# Patient Record
Sex: MALE | Race: WHITE | NOT HISPANIC OR LATINO | Employment: FULL TIME | URBAN - NONMETROPOLITAN AREA
[De-identification: names, ages, dates, MRNs, and addresses within clinical notes are randomized per-mention and may not be internally consistent; named-entity substitution may affect disease eponyms.]

---

## 2020-06-10 ENCOUNTER — HOSPITAL ENCOUNTER (INPATIENT)
Facility: HOSPITAL | Age: 65
LOS: 5 days | Discharge: HOME | End: 2020-06-15
Attending: FAMILY MEDICINE | Admitting: FAMILY MEDICINE

## 2020-06-10 ENCOUNTER — APPOINTMENT (OUTPATIENT)
Dept: CT IMAGING | Facility: HOSPITAL | Age: 65
End: 2020-06-10

## 2020-06-10 PROBLEM — N10 ACUTE PYELONEPHRITIS: Status: ACTIVE | Noted: 2020-06-10

## 2020-06-10 PROBLEM — N12 PYELONEPHRITIS: Status: ACTIVE | Noted: 2020-06-10

## 2020-06-10 PROBLEM — E11.65 TYPE 2 DIABETES MELLITUS WITH HYPERGLYCEMIA (HCC): Chronic | Status: ACTIVE | Noted: 2020-06-10

## 2020-06-10 LAB
ALBUMIN SERPL-MCNC: 3.7 G/DL (ref 3.5–5.2)
ALBUMIN/GLOB SERPL: 1.1 G/DL
ALP SERPL-CCNC: 142 U/L (ref 39–117)
ALT SERPL W P-5'-P-CCNC: 24 U/L (ref 1–41)
ANION GAP SERPL CALCULATED.3IONS-SCNC: 14 MMOL/L (ref 5–15)
AST SERPL-CCNC: 12 U/L (ref 1–40)
BACTERIA UR QL AUTO: ABNORMAL /HPF
BACTERIA UR QL AUTO: ABNORMAL /HPF
BASOPHILS # BLD AUTO: 0.07 10*3/MM3 (ref 0–0.2)
BASOPHILS NFR BLD AUTO: 0.4 % (ref 0–1.5)
BILIRUB SERPL-MCNC: 0.6 MG/DL (ref 0.2–1.2)
BILIRUB UR QL STRIP: NEGATIVE
BILIRUB UR QL STRIP: NEGATIVE
BUN BLD-MCNC: 27 MG/DL (ref 8–23)
BUN/CREAT SERPL: 25.7 (ref 7–25)
CALCIUM SPEC-SCNC: 9 MG/DL (ref 8.6–10.5)
CHLORIDE SERPL-SCNC: 97 MMOL/L (ref 98–107)
CLARITY UR: ABNORMAL
CLARITY UR: CLEAR
CO2 SERPL-SCNC: 27 MMOL/L (ref 22–29)
COLOR UR: YELLOW
COLOR UR: YELLOW
CREAT BLD-MCNC: 1.05 MG/DL (ref 0.76–1.27)
D-LACTATE SERPL-SCNC: 1.3 MMOL/L (ref 0.5–2)
DEPRECATED RDW RBC AUTO: 39.3 FL (ref 37–54)
EOSINOPHIL # BLD AUTO: 0.02 10*3/MM3 (ref 0–0.4)
EOSINOPHIL NFR BLD AUTO: 0.1 % (ref 0.3–6.2)
ERYTHROCYTE [DISTWIDTH] IN BLOOD BY AUTOMATED COUNT: 12.9 % (ref 12.3–15.4)
GFR SERPL CREATININE-BSD FRML MDRD: 71 ML/MIN/1.73
GLOBULIN UR ELPH-MCNC: 3.4 GM/DL
GLUCOSE BLD-MCNC: 279 MG/DL (ref 65–99)
GLUCOSE BLDC GLUCOMTR-MCNC: 243 MG/DL (ref 70–130)
GLUCOSE UR STRIP-MCNC: ABNORMAL MG/DL
GLUCOSE UR STRIP-MCNC: NEGATIVE MG/DL
HBA1C MFR BLD: 10.2 % (ref 4.8–5.6)
HCT VFR BLD AUTO: 34.1 % (ref 37.5–51)
HGB BLD-MCNC: 11.5 G/DL (ref 13–17.7)
HGB UR QL STRIP.AUTO: ABNORMAL
HGB UR QL STRIP.AUTO: ABNORMAL
HYALINE CASTS UR QL AUTO: ABNORMAL /LPF
HYALINE CASTS UR QL AUTO: ABNORMAL /LPF
KETONES UR QL STRIP: ABNORMAL
KETONES UR QL STRIP: NEGATIVE
LEUKOCYTE ESTERASE UR QL STRIP.AUTO: ABNORMAL
LEUKOCYTE ESTERASE UR QL STRIP.AUTO: ABNORMAL
LYMPHOCYTES # BLD AUTO: 1.58 10*3/MM3 (ref 0.7–3.1)
LYMPHOCYTES NFR BLD AUTO: 9.2 % (ref 19.6–45.3)
MCH RBC QN AUTO: 28.5 PG (ref 26.6–33)
MCHC RBC AUTO-ENTMCNC: 33.7 G/DL (ref 31.5–35.7)
MCV RBC AUTO: 84.4 FL (ref 79–97)
MONOCYTES # BLD AUTO: 0.88 10*3/MM3 (ref 0.1–0.9)
MONOCYTES NFR BLD AUTO: 5.1 % (ref 5–12)
NEUTROPHILS # BLD AUTO: 14.51 10*3/MM3 (ref 1.7–7)
NEUTROPHILS NFR BLD AUTO: 84.4 % (ref 42.7–76)
NITRITE UR QL STRIP: NEGATIVE
NITRITE UR QL STRIP: NEGATIVE
PH UR STRIP.AUTO: <=5 [PH] (ref 5–8)
PH UR STRIP.AUTO: <=5 [PH] (ref 5–8)
PLATELET # BLD AUTO: 241 10*3/MM3 (ref 140–450)
PMV BLD AUTO: 11.1 FL (ref 6–12)
POTASSIUM BLD-SCNC: 3.6 MMOL/L (ref 3.5–5.2)
PROT SERPL-MCNC: 7.1 G/DL (ref 6–8.5)
PROT UR QL STRIP: ABNORMAL
PROT UR QL STRIP: ABNORMAL
RBC # BLD AUTO: 4.04 10*6/MM3 (ref 4.14–5.8)
RBC # UR: ABNORMAL /HPF
RBC # UR: ABNORMAL /HPF
REF LAB TEST METHOD: ABNORMAL
REF LAB TEST METHOD: ABNORMAL
SODIUM BLD-SCNC: 138 MMOL/L (ref 136–145)
SP GR UR STRIP: 1.02 (ref 1–1.03)
SP GR UR STRIP: >1.03 (ref 1–1.03)
SQUAMOUS #/AREA URNS HPF: ABNORMAL /HPF
SQUAMOUS #/AREA URNS HPF: ABNORMAL /HPF
UROBILINOGEN UR QL STRIP: ABNORMAL
UROBILINOGEN UR QL STRIP: ABNORMAL
WBC NRBC COR # BLD: 17.2 10*3/MM3 (ref 3.4–10.8)
WBC UR QL AUTO: ABNORMAL /HPF
WBC UR QL AUTO: ABNORMAL /HPF

## 2020-06-10 PROCEDURE — 87040 BLOOD CULTURE FOR BACTERIA: CPT | Performed by: NURSE PRACTITIONER

## 2020-06-10 PROCEDURE — 74177 CT ABD & PELVIS W/CONTRAST: CPT

## 2020-06-10 PROCEDURE — 25010000002 ENOXAPARIN PER 10 MG: Performed by: NURSE PRACTITIONER

## 2020-06-10 PROCEDURE — 25010000002 CEFTRIAXONE PER 250 MG: Performed by: NURSE PRACTITIONER

## 2020-06-10 PROCEDURE — 81001 URINALYSIS AUTO W/SCOPE: CPT | Performed by: FAMILY MEDICINE

## 2020-06-10 PROCEDURE — 81001 URINALYSIS AUTO W/SCOPE: CPT | Performed by: NURSE PRACTITIONER

## 2020-06-10 PROCEDURE — 83036 HEMOGLOBIN GLYCOSYLATED A1C: CPT | Performed by: NURSE PRACTITIONER

## 2020-06-10 PROCEDURE — 82962 GLUCOSE BLOOD TEST: CPT

## 2020-06-10 PROCEDURE — 85027 COMPLETE CBC AUTOMATED: CPT | Performed by: NURSE PRACTITIONER

## 2020-06-10 PROCEDURE — 87086 URINE CULTURE/COLONY COUNT: CPT | Performed by: FAMILY MEDICINE

## 2020-06-10 PROCEDURE — 63710000001 INSULIN LISPRO (HUMAN) PER 5 UNITS: Performed by: NURSE PRACTITIONER

## 2020-06-10 PROCEDURE — 80053 COMPREHEN METABOLIC PANEL: CPT | Performed by: NURSE PRACTITIONER

## 2020-06-10 PROCEDURE — 25010000002 IOPAMIDOL 61 % SOLUTION: Performed by: FAMILY MEDICINE

## 2020-06-10 PROCEDURE — 99254 IP/OBS CNSLTJ NEW/EST MOD 60: CPT | Performed by: UROLOGY

## 2020-06-10 PROCEDURE — 83605 ASSAY OF LACTIC ACID: CPT | Performed by: NURSE PRACTITIONER

## 2020-06-10 RX ORDER — ONDANSETRON 2 MG/ML
4 INJECTION INTRAMUSCULAR; INTRAVENOUS EVERY 6 HOURS PRN
Status: DISCONTINUED | OUTPATIENT
Start: 2020-06-10 | End: 2020-06-15 | Stop reason: HOSPADM

## 2020-06-10 RX ORDER — HYDROCODONE BITARTRATE AND ACETAMINOPHEN 5; 325 MG/1; MG/1
1 TABLET ORAL EVERY 4 HOURS PRN
Status: DISCONTINUED | OUTPATIENT
Start: 2020-06-10 | End: 2020-06-15 | Stop reason: HOSPADM

## 2020-06-10 RX ORDER — DESLORATADINE 5 MG/1
5 TABLET ORAL DAILY
COMMUNITY

## 2020-06-10 RX ORDER — NICOTINE POLACRILEX 4 MG
15 LOZENGE BUCCAL
Status: DISCONTINUED | OUTPATIENT
Start: 2020-06-10 | End: 2020-06-15 | Stop reason: HOSPADM

## 2020-06-10 RX ORDER — ONDANSETRON 4 MG/1
4 TABLET, FILM COATED ORAL EVERY 6 HOURS PRN
Status: DISCONTINUED | OUTPATIENT
Start: 2020-06-10 | End: 2020-06-15 | Stop reason: HOSPADM

## 2020-06-10 RX ORDER — GLIMEPIRIDE 2 MG/1
2 TABLET ORAL
COMMUNITY

## 2020-06-10 RX ORDER — DEXTROSE MONOHYDRATE 25 G/50ML
25 INJECTION, SOLUTION INTRAVENOUS
Status: DISCONTINUED | OUTPATIENT
Start: 2020-06-10 | End: 2020-06-10 | Stop reason: SDUPTHER

## 2020-06-10 RX ORDER — DEXTROSE MONOHYDRATE 25 G/50ML
25 INJECTION, SOLUTION INTRAVENOUS
Status: DISCONTINUED | OUTPATIENT
Start: 2020-06-10 | End: 2020-06-15 | Stop reason: HOSPADM

## 2020-06-10 RX ORDER — SODIUM CHLORIDE 0.9 % (FLUSH) 0.9 %
10 SYRINGE (ML) INJECTION AS NEEDED
Status: DISCONTINUED | OUTPATIENT
Start: 2020-06-10 | End: 2020-06-15 | Stop reason: HOSPADM

## 2020-06-10 RX ORDER — FUROSEMIDE 40 MG/1
40 TABLET ORAL DAILY
COMMUNITY

## 2020-06-10 RX ORDER — SODIUM CHLORIDE 9 MG/ML
75 INJECTION, SOLUTION INTRAVENOUS CONTINUOUS
Status: DISCONTINUED | OUTPATIENT
Start: 2020-06-10 | End: 2020-06-14

## 2020-06-10 RX ORDER — NICOTINE POLACRILEX 4 MG
15 LOZENGE BUCCAL
Status: DISCONTINUED | OUTPATIENT
Start: 2020-06-10 | End: 2020-06-10 | Stop reason: SDUPTHER

## 2020-06-10 RX ORDER — SODIUM CHLORIDE 0.9 % (FLUSH) 0.9 %
10 SYRINGE (ML) INJECTION EVERY 12 HOURS SCHEDULED
Status: DISCONTINUED | OUTPATIENT
Start: 2020-06-10 | End: 2020-06-15 | Stop reason: HOSPADM

## 2020-06-10 RX ORDER — LOSARTAN POTASSIUM AND HYDROCHLOROTHIAZIDE 12.5; 5 MG/1; MG/1
1 TABLET ORAL DAILY
COMMUNITY

## 2020-06-10 RX ADMIN — SODIUM CHLORIDE 75 ML/HR: 9 INJECTION, SOLUTION INTRAVENOUS at 20:02

## 2020-06-10 RX ADMIN — CEFTRIAXONE SODIUM 1 G: 1 INJECTION, POWDER, FOR SOLUTION INTRAMUSCULAR; INTRAVENOUS at 20:02

## 2020-06-10 RX ADMIN — IOPAMIDOL 50 ML: 612 INJECTION, SOLUTION INTRAVENOUS at 17:11

## 2020-06-10 RX ADMIN — ENOXAPARIN SODIUM 40 MG: 40 INJECTION SUBCUTANEOUS at 20:18

## 2020-06-10 RX ADMIN — INSULIN LISPRO 4 UNITS: 100 INJECTION, SOLUTION INTRAVENOUS; SUBCUTANEOUS at 20:17

## 2020-06-10 RX ADMIN — IOPAMIDOL 100 ML: 612 INJECTION, SOLUTION INTRAVENOUS at 21:00

## 2020-06-10 NOTE — H&P
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 He was on the vessel. The 30th of May had some hematuria , lasted 12 hours & went away. He says he had some dysuria staring on the 30th & has persisted since onset, he says it \"comes & goes\". 6/10/20 @ 6 AM he  noted pain @ [facility] ( described as sharp, persistant, unable to get comfortable until 4 PM) with occasional burning when he urinates, has not noted any blood in the urine. No flank pain, no fever, no chills, 1 episode of vomiting  6/10/20 AM, no nausea , no diarrhea. The patient has no history of kidney stones, no recent injury. No sick contacts. The patient was seen in the office for initial eval @ 7pm 6/10/20, at that time VS were stable, no fever, pt was given Rocephin and Toradol. He presented this am 6/11/20 for f/u, he was sent for stat labs and noncontrasted CT. WBC was 17 with left shift, CT showed edema surrounding left kidney. The patient returned to the office post CT for recheck his temperature was noted to be 102 with increased discomfort. He was direct admitted for further evaluation.     Review of Systems   Constitutional: Positive for activity change, appetite change, diaphoresis, fatigue and fever. Negative for chills.   Respiratory: Negative for cough, shortness of breath and wheezing.    Cardiovascular: Negative for chest pain, palpitations and leg swelling.   Gastrointestinal: Positive for abdominal pain and nausea. Negative for abdominal distention, diarrhea and vomiting.   Genitourinary: Positive for dysuria and flank pain. Negative for decreased urine volume, difficulty urinating and frequency.   Musculoskeletal: Positive for back pain. Negative for arthralgias, joint swelling, myalgias and neck pain.   Skin: Negative for color change and wound.   Neurological: Negative " for dizziness, light-headedness, numbness and headaches.   Hematological: Negative for adenopathy. Does not bruise/bleed easily.   Psychiatric/Behavioral: Negative for agitation and confusion.        No past medical history on file.  No past surgical history on file.  No family history on file.  Social History     Tobacco Use   • Smoking status: Not on file   Substance Use Topics   • Alcohol use: Not on file   • Drug use: Not on file     No medications prior to admission.     Allergies:  Patient has no known allergies.    Objective      Vital Signs  Temp:  [99.5 °F (37.5 °C)] 99.5 °F (37.5 °C)  Heart Rate:  [104] 104  Resp:  [18] 18  BP: (135)/(91) 135/91    Physical Exam   Constitutional: He is oriented to person, place, and time. He appears well-developed and well-nourished.   HENT:   Head: Normocephalic and atraumatic.   Eyes: Pupils are equal, round, and reactive to light. Conjunctivae and EOM are normal.   Neck: Normal range of motion. Neck supple.   Cardiovascular: Regular rhythm. Tachycardia present.   No murmur heard.  Pulmonary/Chest: Effort normal and breath sounds normal.   Abdominal: Soft. Bowel sounds are normal. He exhibits no distension. There is no tenderness. There is no guarding.   Musculoskeletal: Normal range of motion.   Neurological: He is alert and oriented to person, place, and time. No cranial nerve deficit.   Skin: Skin is warm and dry. Capillary refill takes less than 2 seconds. No rash noted. No erythema. No pallor.   Psychiatric: He has a normal mood and affect.       Results Review:   I reviewed the patient's new clinical results.      Assessment/Plan       Acute pyelonephritis    Type 2 diabetes mellitus with hyperglycemia (CMS/HCC)      Assessment:  (UTI R/O Pyelonephritis  Leukocytosis  Fever  Type 2 DM).     Plan:   (NPO until CT  Strict I&O  IV Rocephin  Repeat Labs & UA  Blood CX  Stat Contrasted CT  Urology Consult  Will monitor glucose and cover with insulin ).       I discussed  the patients findings and my recommendations with patient    Emilia Bender, LOI  06/10/20  16:54    Time: will admit     Pt. was seen and independently examined by me.  I have reviewed the PA/ARNP's note and agree with above.      Electronically signed by Miguel Ángel Saul MD on 6/10/2020 at 17:33

## 2020-06-10 NOTE — CONSULTS
Urology    Mr. Cifuentes is 64 y.o. male    REASON FOR CONSULT/CHIEF COMPLAINT: Asked to see for UTI      HPI  I asked to see this 64-year-old white male for urinary tract infection gross hematuria.  1 week ago began having the sudden onset of moderate severity dysuria and some low-grade fever.  He also experienced gross hematuria for a couple days.  He then progressed to having left-sided lower quadrant discomfort and fever up to 102.5 today.  Patient says he had a CT done at [facility] which showed no evidence of a stone.  No prior urinary tract infection    The following portions of the patient's history were reviewed and updated as appropriate: allergies, current medications, past family history, past medical history, past social history, past surgical history and problem list.    Review of Systems   Constitutional: Positive for chills and fever. Negative for appetite change.   HENT: Negative for hearing loss and sore throat.    Eyes: Negative for pain and redness.   Respiratory: Negative for cough and shortness of breath.    Cardiovascular: Negative for chest pain and leg swelling.   Gastrointestinal: Positive for abdominal pain. Negative for anal bleeding, nausea and vomiting.   Endocrine: Negative for cold intolerance and heat intolerance.   Genitourinary: Positive for dysuria, flank pain and hematuria.   Musculoskeletal: Negative for joint swelling and myalgias.   Skin: Negative for color change and rash.   Allergic/Immunologic: Negative for immunocompromised state.   Neurological: Negative for dizziness and speech difficulty.   Hematological: Negative for adenopathy. Does not bruise/bleed easily.   Psychiatric/Behavioral: Negative for dysphoric mood and suicidal ideas.       Medications Prior to Admission   Medication Sig Dispense Refill Last Dose   • desloratadine (CLARINEX) 5 MG tablet Take 5 mg by mouth Daily.      • furosemide (LASIX) 40 MG tablet Take 40 mg by mouth Daily.      • glimepiride  (AMARYL) 2 MG tablet Take 2 mg by mouth Every Morning Before Breakfast.      • losartan-hydrochlorothiazide (HYZAAR) 50-12.5 MG per tablet Take 1 tablet by mouth Daily.            Current Facility-Administered Medications:   •  cefTRIAXone (ROCEPHIN) 1 g/100 mL 0.9% NS (MBP), 1 g, Intravenous, Q24H, Emilia Bender APRN  •  dextrose (D50W) 25 g/ 50mL Intravenous Solution 25 g, 25 g, Intravenous, Q15 Min PRN, Emilia Bender APRN  •  dextrose (GLUTOSE) oral gel 15 g, 15 g, Oral, Q15 Min PRN, Emilia Bender APRN  •  enoxaparin (LOVENOX) syringe 40 mg, 40 mg, Subcutaneous, Q24H, Emilia Bender APRN  •  glucagon (human recombinant) (GLUCAGEN DIAGNOSTIC) injection 1 mg, 1 mg, Subcutaneous, Q15 Min PRN, Emilia Bender APRN  •  HYDROcodone-acetaminophen (NORCO) 5-325 MG per tablet 1 tablet, 1 tablet, Oral, Q4H PRN, Emilia Bender APRN  •  insulin lispro (humaLOG) injection 0-9 Units, 0-9 Units, Subcutaneous, TID AC, Emilia Bender APRN  •  ondansetron (ZOFRAN) tablet 4 mg, 4 mg, Oral, Q6H PRN **OR** ondansetron (ZOFRAN) injection 4 mg, 4 mg, Intravenous, Q6H PRN, Emilia Bender APRN  •  sodium chloride 0.9 % flush 10 mL, 10 mL, Intravenous, Q12H, Emilia Bender APRN  •  sodium chloride 0.9 % flush 10 mL, 10 mL, Intravenous, PRN, Emilia Bender APRN  •  sodium chloride 0.9 % infusion, 75 mL/hr, Intravenous, Continuous, Emilia Bender APRN    Past Medical History:   Diagnosis Date   • Diabetes mellitus (CMS/HCC)    • Hyperlipidemia        No past surgical history on file.    Social History     Socioeconomic History   • Marital status:      Spouse name: Not on file   • Number of children: Not on file   • Years of education: Not on file   • Highest education level: Not on file   Tobacco Use   • Smoking status: Never Smoker   • Smokeless tobacco: Never Used       No family history on file.    /91 (BP Location: Left arm, Patient Position: Sitting)    "Pulse 104   Temp 99.5 °F (37.5 °C) (Oral)   Resp 18   Ht 175.3 cm (69\")   Wt 130 kg (286 lb 9.6 oz)   SpO2 96%   BMI 42.32 kg/m²     Physical Exam  Constitutional: Well nourished, Well developed; No apparent distress; Vital reviewed as above  Psychiatric: Appropriate affect; Alert and oriented  Eyes: Unremarkable  Musculoskeletal: Normal gait and station  GI: Abdomen is soft, non-tender  Respiratory: No distress; Unlabored movement; No accessory musculature needed with symmetric movements  Skin: No pallor or diaphoresis  Lymphatic: No adenopathy neck or groin        Lab Results   Component Value Date    GLUCOSE 279 (H) 06/10/2020    BUN 27 (H) 06/10/2020    CREATININE 1.05 06/10/2020    EGFRIFNONA 71 06/10/2020    BCR 25.7 (H) 06/10/2020    CO2 27.0 06/10/2020    CALCIUM 9.0 06/10/2020    ALBUMIN 3.70 06/10/2020    AST 12 06/10/2020    ALT 24 06/10/2020     Lab Results   Component Value Date    GLUCOSE 279 (H) 06/10/2020    CALCIUM 9.0 06/10/2020     06/10/2020    K 3.6 06/10/2020    CO2 27.0 06/10/2020    CL 97 (L) 06/10/2020    BUN 27 (H) 06/10/2020    CREATININE 1.05 06/10/2020    EGFRIFNONA 71 06/10/2020    BCR 25.7 (H) 06/10/2020    ANIONGAP 14.0 06/10/2020     Lab Results   Component Value Date    WBC 17.20 (H) 06/10/2020    HGB 11.5 (L) 06/10/2020    HCT 34.1 (L) 06/10/2020    MCV 84.4 06/10/2020     06/10/2020     No results found for: PSA  No results found for: URINECX  Brief Urine Lab Results  (Last result in the past 365 days)      Color   Clarity   Blood   Leuk Est   Nitrite   Protein   CREAT   Urine HCG        06/10/20 1814 Yellow Cloudy Small (1+) Moderate (2+) Negative 30 mg/dL (1+)             Assessment and Plan  Febrile UTI, probable prostatitis with possible ascending infection resulting in pyelonephritis  Gross hematuria    -Agree with ceftriaxone as this is probably a gram-negative alicia infection.  Would wait until he defervesced is before considering an oral " antibiotic.  -Patient will need a lower urinary tract evaluation by cystoscopy after resolution of the infection.Cystoscopy as the definitive lower urinary tract study is discussed . The risks of pain and discomfort, infection, and urethral stricture are discussed with the patient including the technique used in the office setting.  All patient questions were answered.     Thank you for asking me to see this gentleman    (Please note that portions of this note were completed with a voice recognition program.)  Ishaan Diaz MD  06/10/20  18:31

## 2020-06-10 NOTE — PLAN OF CARE
Problem: Patient Care Overview  Goal: Plan of Care Review  Outcome: Ongoing (interventions implemented as appropriate)  Flowsheets (Taken 6/10/2020 8855)  Plan of Care Reviewed With: patient  Outcome Summary: pt direct admit from dr. whitney office with pyelonephritis pt complains of frequent urination, pain and overall fatigue. Ct ordered, labs ordered iv abx ordered

## 2020-06-11 LAB
GLUCOSE BLDC GLUCOMTR-MCNC: 239 MG/DL (ref 70–130)
GLUCOSE BLDC GLUCOMTR-MCNC: 242 MG/DL (ref 70–130)
GLUCOSE BLDC GLUCOMTR-MCNC: 268 MG/DL (ref 70–130)

## 2020-06-11 PROCEDURE — 63710000001 INSULIN LISPRO (HUMAN) PER 5 UNITS: Performed by: NURSE PRACTITIONER

## 2020-06-11 PROCEDURE — 99232 SBSQ HOSP IP/OBS MODERATE 35: CPT | Performed by: UROLOGY

## 2020-06-11 PROCEDURE — 25010000002 CEFTRIAXONE PER 250 MG: Performed by: NURSE PRACTITIONER

## 2020-06-11 PROCEDURE — 82962 GLUCOSE BLOOD TEST: CPT

## 2020-06-11 PROCEDURE — 25010000002 ENOXAPARIN PER 10 MG: Performed by: NURSE PRACTITIONER

## 2020-06-11 RX ORDER — ACETAMINOPHEN 325 MG/1
650 TABLET ORAL EVERY 4 HOURS PRN
Status: DISCONTINUED | OUTPATIENT
Start: 2020-06-11 | End: 2020-06-15 | Stop reason: HOSPADM

## 2020-06-11 RX ADMIN — INSULIN LISPRO 6 UNITS: 100 INJECTION, SOLUTION INTRAVENOUS; SUBCUTANEOUS at 13:31

## 2020-06-11 RX ADMIN — SODIUM CHLORIDE 75 ML/HR: 9 INJECTION, SOLUTION INTRAVENOUS at 08:10

## 2020-06-11 RX ADMIN — INSULIN LISPRO 4 UNITS: 100 INJECTION, SOLUTION INTRAVENOUS; SUBCUTANEOUS at 08:09

## 2020-06-11 RX ADMIN — ENOXAPARIN SODIUM 40 MG: 40 INJECTION SUBCUTANEOUS at 17:19

## 2020-06-11 RX ADMIN — ACETAMINOPHEN 650 MG: 325 TABLET, FILM COATED ORAL at 19:37

## 2020-06-11 RX ADMIN — ACETAMINOPHEN 650 MG: 325 TABLET, FILM COATED ORAL at 00:16

## 2020-06-11 RX ADMIN — INSULIN LISPRO 4 UNITS: 100 INJECTION, SOLUTION INTRAVENOUS; SUBCUTANEOUS at 17:19

## 2020-06-11 RX ADMIN — SODIUM CHLORIDE 75 ML/HR: 9 INJECTION, SOLUTION INTRAVENOUS at 19:37

## 2020-06-11 RX ADMIN — CEFTRIAXONE SODIUM 1 G: 1 INJECTION, POWDER, FOR SOLUTION INTRAMUSCULAR; INTRAVENOUS at 17:19

## 2020-06-11 NOTE — PROGRESS NOTES
[facility]  Daily Progress Note  Ed Cifuentes Jr.  MRN: [id number] LOS: 1    Admit Date: 6/10/2020   6/11/2020 13:41    Subjective:          Chief Complaint:  Flank pain    Interval History:    Reviewed overnight events and nursing notes.   Status:  improving  Pain:  some relief        ROS:  10 point ROS obtained:   Gen: No fevers  HEENT: No migraine or visual change  Lung: No cough, hemoptysis or wheezing  Cv: No chest pain or pressure  Abd: No nausea or vomiting, no change in bowel function, no gi bleeding  Ext: No increased pain or swelling  Neuro: No seizure or syncope  Skin: No new rashes  Endocrine: No polyuria, polyphagia or polydipsia  Psych: No increased anxiety or depression  MS: No increase in joint pain or swelling reported    DIET:  Diet Regular; Consistent Carbohydrate    Medications:     sodium chloride 75 mL/hr Last Rate: 75 mL/hr (06/11/20 0810)       cefTRIAXone 1 g Intravenous Q24H   enoxaparin 40 mg Subcutaneous Q24H   insulin lispro 0-9 Units Subcutaneous TID AC   sodium chloride 10 mL Intravenous Q12H       Data:     Code Status:   Code Status and Medical Interventions:   Ordered at: 06/10/20 1653     Level Of Support Discussed With:    Patient     Code Status:    CPR     Medical Interventions (Level of Support Prior to Arrest):    Full       No family history on file.  Social History     Socioeconomic History   • Marital status:      Spouse name: Not on file   • Number of children: Not on file   • Years of education: Not on file   • Highest education level: Not on file   Tobacco Use   • Smoking status: Never Smoker   • Smokeless tobacco: Never Used       Labs:    Lab Results (last 72 hours)     Procedure Component Value Units Date/Time    Urine Culture - Urine, Urine, Clean Catch [[id number]]  (Normal) Collected:  06/10/20 2211    Specimen:  Urine, Clean Catch Updated:  06/11/20 1155     Urine Culture Culture in progress    POC Glucose Once [[id number]]  (Abnormal)  Collected:  06/11/20 0804    Specimen:  Blood Updated:  06/11/20 0816     Glucose 242 mg/dL      Comment: : [id number] Uri Calle ID: [id number]       Urinalysis With Culture If Indicated - Urine, Clean Catch [[id number]]  (Abnormal) Collected:  06/10/20 2211    Specimen:  Urine, Clean Catch Updated:  06/10/20 2228     Color, UA Yellow     Appearance, UA Clear     pH, UA <=5.0     Specific Gravity, UA >1.030     Glucose, UA Negative     Ketones, UA Trace     Bilirubin, UA Negative     Blood, UA Trace     Protein, UA Trace     Leuk Esterase, UA Small (1+)     Nitrite, UA Negative     Urobilinogen, UA 0.2 E.U./dL    Urinalysis, Microscopic Only - Urine, Clean Catch [[id number]]  (Abnormal) Collected:  06/10/20 2211    Specimen:  Urine, Clean Catch Updated:  06/10/20 2228     RBC, UA 3-5 /HPF      WBC, UA Too Numerous to Count /HPF      Bacteria, UA None Seen /HPF      Squamous Epithelial Cells, UA 3-6 /HPF      Hyaline Casts, UA 0-2 /LPF      Methodology Automated Microscopy    POC Glucose Once [[id number]]  (Abnormal) Collected:  06/10/20 2008    Specimen:  Blood Updated:  06/10/20 2027     Glucose 243 mg/dL      Comment: : [id number] Janee TylerMeter ID: [id number]       Urinalysis With Microscopic If Indicated (No Culture) - Urine, Clean Catch [[id number]]  (Abnormal) Collected:  06/10/20 1814    Specimen:  Urine, Clean Catch Updated:  06/10/20 1828     Color, UA Yellow     Appearance, UA Cloudy     pH, UA <=5.0     Specific Gravity, UA 1.018     Glucose,  mg/dL (Trace)     Ketones, UA Negative     Bilirubin, UA Negative     Blood, UA Small (1+)     Protein, UA 30 mg/dL (1+)     Leuk Esterase, UA Moderate (2+)     Nitrite, UA Negative     Urobilinogen, UA 0.2 E.U./dL    Urinalysis, Microscopic Only - Urine, Clean Catch [[id number]]  (Abnormal) Collected:  06/10/20 1814    Specimen:  Urine, Clean Catch Updated:  06/10/20 1828     RBC, UA 6-12 /HPF      WBC, UA Too Numerous to Count /HPF       Bacteria, UA None Seen /HPF      Squamous Epithelial Cells, UA None Seen /HPF      Hyaline Casts, UA 3-6 /LPF      Methodology Automated Microscopy    Comprehensive Metabolic Panel [[id number]]  (Abnormal) Collected:  06/10/20 1748    Specimen:  Blood Updated:  06/10/20 1827     Glucose 279 mg/dL      BUN 27 mg/dL      Creatinine 1.05 mg/dL      Sodium 138 mmol/L      Potassium 3.6 mmol/L      Chloride 97 mmol/L      CO2 27.0 mmol/L      Calcium 9.0 mg/dL      Total Protein 7.1 g/dL      Albumin 3.70 g/dL      ALT (SGPT) 24 U/L      AST (SGOT) 12 U/L      Alkaline Phosphatase 142 U/L      Total Bilirubin 0.6 mg/dL      eGFR Non African Amer 71 mL/min/1.73      Globulin 3.4 gm/dL      A/G Ratio 1.1 g/dL      BUN/Creatinine Ratio 25.7     Anion Gap 14.0 mmol/L     Narrative:       GFR Normal >60  Chronic Kidney Disease <60  Kidney Failure <15      Lactic Acid, Plasma [[id number]]  (Normal) Collected:  06/10/20 1748    Specimen:  Blood Updated:  06/10/20 1816     Lactate 1.3 mmol/L     CBC & Differential [[id number]] Collected:  06/10/20 1748    Specimen:  Blood Updated:  06/10/20 1814    Narrative:       The following orders were created for panel order CBC & Differential.  Procedure                               Abnormality         Status                     ---------                               -----------         ------                     Manual Differential[[id number]]                                                         CBC Auto Differential[[id number]]        Abnormal            Final result                 Please view results for these tests on the individual orders.    CBC Auto Differential [[id number]]  (Abnormal) Collected:  06/10/20 1748    Specimen:  Blood Updated:  06/10/20 1814     WBC 17.20 10*3/mm3      RBC 4.04 10*6/mm3      Hemoglobin 11.5 g/dL      Hematocrit 34.1 %      MCV 84.4 fL      MCH 28.5 pg      MCHC 33.7 g/dL      RDW 12.9 %      RDW-SD 39.3 fl      MPV 11.1 fL      Platelets 241 10*3/mm3   "    Neutrophil % 84.4 %      Lymphocyte % 9.2 %      Monocyte % 5.1 %      Eosinophil % 0.1 %      Basophil % 0.4 %      Neutrophils, Absolute 14.51 10*3/mm3      Lymphocytes, Absolute 1.58 10*3/mm3      Monocytes, Absolute 0.88 10*3/mm3      Eosinophils, Absolute 0.02 10*3/mm3      Basophils, Absolute 0.07 10*3/mm3     Hemoglobin A1c [[id number]]  (Abnormal) Collected:  06/10/20 1748    Specimen:  Blood Updated:  06/10/20 1813     Hemoglobin A1C 10.20 %     Narrative:       Hemoglobin A1C Ranges:    Increased Risk for Diabetes  5.7% to 6.4%  Diabetes                     >= 6.5%  Diabetic Goal                < 7.0%    Blood Culture - Blood, Arm, Right [[id number]] Collected:  06/10/20 1748    Specimen:  Blood from Arm, Right Updated:  06/10/20 1800    Blood Culture - Blood, Arm, Left [[id number]] Collected:  06/10/20 1748    Specimen:  Blood from Arm, Left Updated:  06/10/20 1759            Objective:     Vitals: /75 (BP Location: Left arm, Patient Position: Lying)   Pulse 77   Temp 99.4 °F (37.4 °C) (Oral)   Resp 18   Ht 175.3 cm (69\")   Wt 130 kg (286 lb 9.6 oz)   SpO2 94%   BMI 42.32 kg/m²    Intake/Output Summary (Last 24 hours) at 2020 1341  Last data filed at 2020 0809  Gross per 24 hour   Intake 967 ml   Output 400 ml   Net 567 ml    Temp (24hrs), Av.5 °F (37.5 °C), Min:98.6 °F (37 °C), Max:101.1 °F (38.4 °C)        Physical Examination:   General appearance - alert, well appearing, and in no distress and oriented to person, place, and time  Mental status - alert, oriented to person, place, and time, normal mood, behavior, speech, dress, motor activity, and thought processes  Eyes - pupils equal and reactive, extraocular eye movements intact  Ears - bilateral TM's and external ear canals normal, hearing grossly normal bilaterally  Mouth - mucous membranes moist, pharynx normal without lesions  Neck - supple, no significant adenopathy  Lymphatics - no palpable lymphadenopathy, no " hepatosplenomegaly  Chest - clear to auscultation, no wheezes, rales or rhonchi, symmetric air entry, no tachypnea, retractions or cyanosis  Heart - normal rate, regular rhythm, normal S1, S2, no murmurs, rubs, clicks or gallops  Abdomen - soft, nontender, nondistended, no masses or organomegaly bowel sounds normal  Neurological - alert, oriented, normal speech, no focal findings or movement disorder noted  Musculoskeletal - no joint tenderness, deformity or swelling  Extremities - peripheral pulses normal, no pedal edema, no clubbing or cyanosis  Skin - normal coloration and turgor, no rashes, no suspicious skin lesions noted      Assessment and Plan:     Primary Problem:  Acute pyelonephritis    Hospital Problem list:    Acute pyelonephritis    Type 2 diabetes mellitus with hyperglycemia (CMS/HCC)    Pyelonephritis      PMH:  Past Medical History:   Diagnosis Date   • Diabetes mellitus (CMS/HCC)    • Hyperlipidemia        Treatment Plan:  Continue IV rocephin  Await urine culture results  Continue IVF/IV analgesia  Appreciate Dr Diaz's help    Discharge planning:   Home tomorrow if afebrile 24 hrs, results of urine culture allow for appropriate po abx, and pain controlled    Reviewed treatment plans with the patient and/or family.   20 minutes spent in face to face interaction and coordination of care.     Electronically signed by Miguel Ángel Saul MD on 6/11/2020 at 13:41

## 2020-06-11 NOTE — PLAN OF CARE
Problem: Patient Care Overview  Goal: Plan of Care Review  Outcome: Ongoing (interventions implemented as appropriate)  Flowsheets (Taken 6/11/2020 0102)  Progress: no change  Plan of Care Reviewed With: patient  Outcome Summary: Temp 101.1- Tylenol given.  Urine cultures and blood cultures obtained.  IVF and abx given as ordered.  SCD's in place.  C/O lower abd discomfort- refused pain medications.  Sleeping between care.  Monitor.

## 2020-06-11 NOTE — PAYOR COMM NOTE
"Ed Aguilar Jr. (64 y.o. Male) [id number]    Attn  Manuel RODRIGUEZ     [facility] phone    Fax        Date of Birth Social Security Number Address Home Phone MRN    1955  [id number] [address] [phone] [id number]    Spiritism Marital Status          Other        Admission Date Admission Type Admitting Provider Attending Provider Department, Room/Bed    6/10/20 Urgent Miguel Ángel Saul MD [place], James Kyle, MD [facility] 3C, 378/1    Discharge Date Discharge Disposition Discharge Destination                       Attending Provider:  Miguel Ángel Saul MD    Allergies:  No Known Allergies    Isolation:  None   Infection:  None   Code Status:  CPR    Ht:  175.3 cm (69\")   Wt:  130 kg (286 lb 9.6 oz)    Admission Cmt:  None   Principal Problem:  Acute pyelonephritis [N10]                 Active Insurance as of 6/10/2020     Primary Coverage     Payor Plan Insurance Group Employer/Plan Group    WORKERS COMPENSATION MISC WORKERS COMPENSATION      Coverage Address Coverage Phone Number Coverage Fax Number Effective Dates    [address] [address] [phone] 6/9/2020 - None Entered    [facility] [id number]       Subscriber Name Subscriber Birth Date Member ID       ED AGUILAR JR. 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 He was on the vessel. The 30th of May had some hematuria , lasted 12 hours & went away. He says he had some dysuria staring on the 30th & has persisted since " "onset, he says it \"comes & goes\". 6/10/20 @ 6 AM he  noted pain @ [facility] ( described as sharp, persistant, unable to get comfortable until 4 PM) with occasional burning when he urinates, has not noted any blood in the urine. No flank pain, no fever, no chills, 1 episode of vomiting  6/10/20 AM, no nausea , no diarrhea. The patient has no history of kidney stones, no recent injury. No sick contacts. The patient was seen in the office for initial eval @ 7pm 6/10/20, at that time VS were stable, no fever, pt was given Rocephin and Toradol. He presented this am 6/11/20 for f/u, he was sent for stat labs and noncontrasted CT. WBC was 17 with left shift, CT showed edema surrounding left kidney. The patient returned to the office post CT for recheck his temperature was noted to be 102 with increased discomfort. He was direct admitted for further evaluation.     Review of Systems   Constitutional: Positive for activity change, appetite change, diaphoresis, fatigue and fever. Negative for chills.   Respiratory: Negative for cough, shortness of breath and wheezing.    Cardiovascular: Negative for chest pain, palpitations and leg swelling.   Gastrointestinal: Positive for abdominal pain and nausea. Negative for abdominal distention, diarrhea and vomiting.   Genitourinary: Positive for dysuria and flank pain. Negative for decreased urine volume, difficulty urinating and frequency.   Musculoskeletal: Positive for back pain. Negative for arthralgias, joint swelling, myalgias and neck pain.   Skin: Negative for color change and wound.   Neurological: Negative for dizziness, light-headedness, numbness and headaches.   Hematological: Negative for adenopathy. Does not bruise/bleed easily.   Psychiatric/Behavioral: Negative for agitation and confusion.        No past medical history on file.  No past surgical history on file.  No family history on file.  Social History     Tobacco Use   • Smoking status: Not on file   Substance Use " Topics   • Alcohol use: Not on file   • Drug use: Not on file     No medications prior to admission.     Allergies:  Patient has no known allergies.    Objective      Vital Signs  Temp:  [99.5 °F (37.5 °C)] 99.5 °F (37.5 °C)  Heart Rate:  [104] 104  Resp:  [18] 18  BP: (135)/(91) 135/91    Physical Exam   Constitutional: He is oriented to person, place, and time. He appears well-developed and well-nourished.   HENT:   Head: Normocephalic and atraumatic.   Eyes: Pupils are equal, round, and reactive to light. Conjunctivae and EOM are normal.   Neck: Normal range of motion. Neck supple.   Cardiovascular: Regular rhythm. Tachycardia present.   No murmur heard.  Pulmonary/Chest: Effort normal and breath sounds normal.   Abdominal: Soft. Bowel sounds are normal. He exhibits no distension. There is no tenderness. There is no guarding.   Musculoskeletal: Normal range of motion.   Neurological: He is alert and oriented to person, place, and time. No cranial nerve deficit.   Skin: Skin is warm and dry. Capillary refill takes less than 2 seconds. No rash noted. No erythema. No pallor.   Psychiatric: He has a normal mood and affect.       Results Review:   I reviewed the patient's new clinical results.      Assessment/Plan       Acute pyelonephritis    Type 2 diabetes mellitus with hyperglycemia (CMS/HCC)      Assessment:  (UTI R/O Pyelonephritis  Leukocytosis  Fever  Type 2 DM).     Plan:   (NPO until CT  Strict I&O  IV Rocephin  Repeat Labs & UA  Blood CX  Stat Contrasted CT  Urology Consult  Will monitor glucose and cover with insulin ).       I discussed the patients findings and my recommendations with patient    Emilia BenderLOI  06/10/20  16:54    Time: will admit     Pt. was seen and independently examined by me.  I have reviewed the PA/ARNP's note and agree with above.      Electronically signed by Miguel Ángel Saul MD on 6/10/2020 at 17:33        Electronically signed by Miguel Ángel Saul MD at 06/10/20  1733         Current Facility-Administered Medications   Medication Dose Route Frequency Provider Last Rate Last Dose   • acetaminophen (TYLENOL) tablet 650 mg  650 mg Oral Q4H PRN Miguel Ángel Saul MD   650 mg at 06/11/20 0016   • cefTRIAXone (ROCEPHIN) 1 g/100 mL 0.9% NS (MBP)  1 g Intravenous Q24H Emilia Bender APRN   1 g at 06/10/20 2002   • dextrose (D50W) 25 g/ 50mL Intravenous Solution 25 g  25 g Intravenous Q15 Min PRN Emilia Bender APRN       • dextrose (GLUTOSE) oral gel 15 g  15 g Oral Q15 Min PRN Emilia Bender APRN       • enoxaparin (LOVENOX) syringe 40 mg  40 mg Subcutaneous Q24H Emilia Bender APRN   40 mg at 06/10/20 2018   • glucagon (human recombinant) (GLUCAGEN DIAGNOSTIC) injection 1 mg  1 mg Subcutaneous Q15 Min PRN Emilia Bender APRN       • HYDROcodone-acetaminophen (NORCO) 5-325 MG per tablet 1 tablet  1 tablet Oral Q4H PRN Emilia Bender APRN       • insulin lispro (humaLOG) injection 0-9 Units  0-9 Units Subcutaneous TID AC Emilia Bender APRN   4 Units at 06/11/20 0809   • ondansetron (ZOFRAN) tablet 4 mg  4 mg Oral Q6H PRN Emilia Bender APRN        Or   • ondansetron (ZOFRAN) injection 4 mg  4 mg Intravenous Q6H PRN Emilia Bender APRN       • sodium chloride 0.9 % flush 10 mL  10 mL Intravenous Q12H Emilia Bender APRN       • sodium chloride 0.9 % flush 10 mL  10 mL Intravenous PRN Emilia Bender APRN       • sodium chloride 0.9 % infusion  75 mL/hr Intravenous Continuous Emilia Bender APRN 75 mL/hr at 06/11/20 0810 75 mL/hr at 06/11/20 0810        Consult Notes (last 24 hours) (Notes from 06/10/20 0817 through 06/11/20 0817)      Ishaan Diaz MD at 06/10/20 1834      Consult Orders    1. Inpatient Urology Consult [[id number]] ordered by Emilia Bender APRN at 06/10/20 1653                Urology    Mr. Cifuentes is 64 y.o. male    REASON FOR CONSULT/CHIEF COMPLAINT: Asked to see for UTI      HPI  I  asked to see this 64-year-old white male for urinary tract infection gross hematuria.  1 week ago began having the sudden onset of moderate severity dysuria and some low-grade fever.  He also experienced gross hematuria for a couple days.  He then progressed to having left-sided lower quadrant discomfort and fever up to 102.5 today.  Patient says he had a CT done at [facility] which showed no evidence of a stone.  No prior urinary tract infection    The following portions of the patient's history were reviewed and updated as appropriate: allergies, current medications, past family history, past medical history, past social history, past surgical history and problem list.    Review of Systems   Constitutional: Positive for chills and fever. Negative for appetite change.   HENT: Negative for hearing loss and sore throat.    Eyes: Negative for pain and redness.   Respiratory: Negative for cough and shortness of breath.    Cardiovascular: Negative for chest pain and leg swelling.   Gastrointestinal: Positive for abdominal pain. Negative for anal bleeding, nausea and vomiting.   Endocrine: Negative for cold intolerance and heat intolerance.   Genitourinary: Positive for dysuria, flank pain and hematuria.   Musculoskeletal: Negative for joint swelling and myalgias.   Skin: Negative for color change and rash.   Allergic/Immunologic: Negative for immunocompromised state.   Neurological: Negative for dizziness and speech difficulty.   Hematological: Negative for adenopathy. Does not bruise/bleed easily.   Psychiatric/Behavioral: Negative for dysphoric mood and suicidal ideas.       Medications Prior to Admission   Medication Sig Dispense Refill Last Dose   • desloratadine (CLARINEX) 5 MG tablet Take 5 mg by mouth Daily.      • furosemide (LASIX) 40 MG tablet Take 40 mg by mouth Daily.      • glimepiride (AMARYL) 2 MG tablet Take 2 mg by mouth Every Morning Before Breakfast.      • losartan-hydrochlorothiazide (HYZAAR)  "50-12.5 MG per tablet Take 1 tablet by mouth Daily.            Current Facility-Administered Medications:   •  cefTRIAXone (ROCEPHIN) 1 g/100 mL 0.9% NS (MBP), 1 g, Intravenous, Q24H, Emliia Bender APRN  •  dextrose (D50W) 25 g/ 50mL Intravenous Solution 25 g, 25 g, Intravenous, Q15 Min PRN, Emilia Bender APRN  •  dextrose (GLUTOSE) oral gel 15 g, 15 g, Oral, Q15 Min PRN, Emilia Bender APRN  •  enoxaparin (LOVENOX) syringe 40 mg, 40 mg, Subcutaneous, Q24H, Emilia Bender APRN  •  glucagon (human recombinant) (GLUCAGEN DIAGNOSTIC) injection 1 mg, 1 mg, Subcutaneous, Q15 Min PRN, Emilia Bender APRN  •  HYDROcodone-acetaminophen (NORCO) 5-325 MG per tablet 1 tablet, 1 tablet, Oral, Q4H PRN, Emilia Bender APRN  •  insulin lispro (humaLOG) injection 0-9 Units, 0-9 Units, Subcutaneous, TID AC, Emilia Bender APRN  •  ondansetron (ZOFRAN) tablet 4 mg, 4 mg, Oral, Q6H PRN **OR** ondansetron (ZOFRAN) injection 4 mg, 4 mg, Intravenous, Q6H PRN, Emilia Bender APRN  •  sodium chloride 0.9 % flush 10 mL, 10 mL, Intravenous, Q12H, Emilia Bender APRN  •  sodium chloride 0.9 % flush 10 mL, 10 mL, Intravenous, PRN, Emilia Bender APRN  •  sodium chloride 0.9 % infusion, 75 mL/hr, Intravenous, Continuous, Emilia Bender APRN    Past Medical History:   Diagnosis Date   • Diabetes mellitus (CMS/HCC)    • Hyperlipidemia        No past surgical history on file.    Social History     Socioeconomic History   • Marital status:      Spouse name: Not on file   • Number of children: Not on file   • Years of education: Not on file   • Highest education level: Not on file   Tobacco Use   • Smoking status: Never Smoker   • Smokeless tobacco: Never Used       No family history on file.    /91 (BP Location: Left arm, Patient Position: Sitting)   Pulse 104   Temp 99.5 °F (37.5 °C) (Oral)   Resp 18   Ht 175.3 cm (69\")   Wt 130 kg (286 lb 9.6 oz)   SpO2 96%   " BMI 42.32 kg/m²      Physical Exam  Constitutional: Well nourished, Well developed; No apparent distress; Vital reviewed as above  Psychiatric: Appropriate affect; Alert and oriented  Eyes: Unremarkable  Musculoskeletal: Normal gait and station  GI: Abdomen is soft, non-tender  Respiratory: No distress; Unlabored movement; No accessory musculature needed with symmetric movements  Skin: No pallor or diaphoresis  Lymphatic: No adenopathy neck or groin        Lab Results   Component Value Date    GLUCOSE 279 (H) 06/10/2020    BUN 27 (H) 06/10/2020    CREATININE 1.05 06/10/2020    EGFRIFNONA 71 06/10/2020    BCR 25.7 (H) 06/10/2020    CO2 27.0 06/10/2020    CALCIUM 9.0 06/10/2020    ALBUMIN 3.70 06/10/2020    AST 12 06/10/2020    ALT 24 06/10/2020     Lab Results   Component Value Date    GLUCOSE 279 (H) 06/10/2020    CALCIUM 9.0 06/10/2020     06/10/2020    K 3.6 06/10/2020    CO2 27.0 06/10/2020    CL 97 (L) 06/10/2020    BUN 27 (H) 06/10/2020    CREATININE 1.05 06/10/2020    EGFRIFNONA 71 06/10/2020    BCR 25.7 (H) 06/10/2020    ANIONGAP 14.0 06/10/2020     Lab Results   Component Value Date    WBC 17.20 (H) 06/10/2020    HGB 11.5 (L) 06/10/2020    HCT 34.1 (L) 06/10/2020    MCV 84.4 06/10/2020     06/10/2020     No results found for: PSA  No results found for: URINECX  Brief Urine Lab Results  (Last result in the past 365 days)      Color   Clarity   Blood   Leuk Est   Nitrite   Protein   CREAT   Urine HCG        06/10/20 1814 Yellow Cloudy Small (1+) Moderate (2+) Negative 30 mg/dL (1+)             Assessment and Plan  Febrile UTI, probable prostatitis with possible ascending infection resulting in pyelonephritis  Gross hematuria    -Agree with ceftriaxone as this is probably a gram-negative alicia infection.  Would wait until he defervesced is before considering an oral antibiotic.  -Patient will need a lower urinary tract evaluation by cystoscopy after resolution of the infection.Cystoscopy as the  definitive lower urinary tract study is discussed . The risks of pain and discomfort, infection, and urethral stricture are discussed with the patient including the technique used in the office setting.  All patient questions were answered.     Thank you for asking me to see this gentleman    (Please note that portions of this note were completed with a voice recognition program.)  Ishaan Diaz MD  06/10/20  18:31          Electronically signed by Ishaan Diaz MD at 06/10/20 4620

## 2020-06-11 NOTE — PLAN OF CARE
Problem: Patient Care Overview  Goal: Plan of Care Review  Outcome: Ongoing (interventions implemented as appropriate)  Flowsheets (Taken 6/11/2020 7055)  Outcome Summary: low grade temp of 99 today.  no complaints of pain voiced. urine continues leyla in color.  continue IVF and IV antibiotics.

## 2020-06-12 LAB
ALBUMIN SERPL-MCNC: 3.2 G/DL (ref 3.5–5.2)
ALBUMIN/GLOB SERPL: 0.9 G/DL
ALP SERPL-CCNC: 183 U/L (ref 39–117)
ALT SERPL W P-5'-P-CCNC: 46 U/L (ref 1–41)
ANION GAP SERPL CALCULATED.3IONS-SCNC: 12 MMOL/L (ref 5–15)
AST SERPL-CCNC: 45 U/L (ref 1–40)
BACTERIA SPEC AEROBE CULT: NORMAL
BASOPHILS # BLD AUTO: 0.05 10*3/MM3 (ref 0–0.2)
BASOPHILS NFR BLD AUTO: 0.4 % (ref 0–1.5)
BILIRUB SERPL-MCNC: 0.4 MG/DL (ref 0.2–1.2)
BUN BLD-MCNC: 12 MG/DL (ref 8–23)
BUN/CREAT SERPL: 14 (ref 7–25)
CALCIUM SPEC-SCNC: 8.7 MG/DL (ref 8.6–10.5)
CHLORIDE SERPL-SCNC: 100 MMOL/L (ref 98–107)
CO2 SERPL-SCNC: 26 MMOL/L (ref 22–29)
CREAT BLD-MCNC: 0.86 MG/DL (ref 0.76–1.27)
CRP SERPL-MCNC: 27.41 MG/DL (ref 0–0.5)
DEPRECATED RDW RBC AUTO: 39.6 FL (ref 37–54)
EOSINOPHIL # BLD AUTO: 0.18 10*3/MM3 (ref 0–0.4)
EOSINOPHIL NFR BLD AUTO: 1.5 % (ref 0.3–6.2)
ERYTHROCYTE [DISTWIDTH] IN BLOOD BY AUTOMATED COUNT: 12.7 % (ref 12.3–15.4)
GFR SERPL CREATININE-BSD FRML MDRD: 90 ML/MIN/1.73
GLOBULIN UR ELPH-MCNC: 3.6 GM/DL
GLUCOSE BLD-MCNC: 327 MG/DL (ref 65–99)
GLUCOSE BLDC GLUCOMTR-MCNC: 201 MG/DL (ref 70–130)
GLUCOSE BLDC GLUCOMTR-MCNC: 256 MG/DL (ref 70–130)
GLUCOSE BLDC GLUCOMTR-MCNC: 282 MG/DL (ref 70–130)
GLUCOSE BLDC GLUCOMTR-MCNC: 298 MG/DL (ref 70–130)
HCT VFR BLD AUTO: 34.9 % (ref 37.5–51)
HGB BLD-MCNC: 11.5 G/DL (ref 13–17.7)
IMM GRANULOCYTES # BLD AUTO: 0.11 10*3/MM3 (ref 0–0.05)
IMM GRANULOCYTES NFR BLD AUTO: 0.9 % (ref 0–0.5)
LYMPHOCYTES # BLD AUTO: 1.1 10*3/MM3 (ref 0.7–3.1)
LYMPHOCYTES NFR BLD AUTO: 9 % (ref 19.6–45.3)
MCH RBC QN AUTO: 28.2 PG (ref 26.6–33)
MCHC RBC AUTO-ENTMCNC: 33 G/DL (ref 31.5–35.7)
MCV RBC AUTO: 85.5 FL (ref 79–97)
MONOCYTES # BLD AUTO: 0.66 10*3/MM3 (ref 0.1–0.9)
MONOCYTES NFR BLD AUTO: 5.4 % (ref 5–12)
NEUTROPHILS # BLD AUTO: 10.1 10*3/MM3 (ref 1.7–7)
NEUTROPHILS NFR BLD AUTO: 82.8 % (ref 42.7–76)
NRBC BLD AUTO-RTO: 0 /100 WBC (ref 0–0.2)
PLATELET # BLD AUTO: 225 10*3/MM3 (ref 140–450)
PMV BLD AUTO: 11.1 FL (ref 6–12)
POTASSIUM BLD-SCNC: 3.9 MMOL/L (ref 3.5–5.2)
PROT SERPL-MCNC: 6.8 G/DL (ref 6–8.5)
RBC # BLD AUTO: 4.08 10*6/MM3 (ref 4.14–5.8)
SODIUM BLD-SCNC: 138 MMOL/L (ref 136–145)
WBC NRBC COR # BLD: 12.2 10*3/MM3 (ref 3.4–10.8)

## 2020-06-12 PROCEDURE — 86140 C-REACTIVE PROTEIN: CPT | Performed by: PHYSICIAN ASSISTANT

## 2020-06-12 PROCEDURE — 25010000002 CEFTRIAXONE PER 250 MG: Performed by: NURSE PRACTITIONER

## 2020-06-12 PROCEDURE — 25010000002 ENOXAPARIN PER 10 MG: Performed by: NURSE PRACTITIONER

## 2020-06-12 PROCEDURE — 99232 SBSQ HOSP IP/OBS MODERATE 35: CPT | Performed by: UROLOGY

## 2020-06-12 PROCEDURE — 82962 GLUCOSE BLOOD TEST: CPT

## 2020-06-12 PROCEDURE — 85025 COMPLETE CBC W/AUTO DIFF WBC: CPT | Performed by: PHYSICIAN ASSISTANT

## 2020-06-12 PROCEDURE — 63710000001 INSULIN DETEMIR PER 5 UNITS: Performed by: PHYSICIAN ASSISTANT

## 2020-06-12 PROCEDURE — 63710000001 INSULIN LISPRO (HUMAN) PER 5 UNITS: Performed by: NURSE PRACTITIONER

## 2020-06-12 PROCEDURE — 80053 COMPREHEN METABOLIC PANEL: CPT | Performed by: PHYSICIAN ASSISTANT

## 2020-06-12 RX ORDER — BISACODYL 10 MG
10 SUPPOSITORY, RECTAL RECTAL DAILY
Status: DISCONTINUED | OUTPATIENT
Start: 2020-06-12 | End: 2020-06-15 | Stop reason: HOSPADM

## 2020-06-12 RX ADMIN — ACETAMINOPHEN 650 MG: 325 TABLET, FILM COATED ORAL at 15:59

## 2020-06-12 RX ADMIN — SODIUM CHLORIDE, PRESERVATIVE FREE 10 ML: 5 INJECTION INTRAVENOUS at 07:38

## 2020-06-12 RX ADMIN — CEFTRIAXONE SODIUM 1 G: 1 INJECTION, POWDER, FOR SOLUTION INTRAMUSCULAR; INTRAVENOUS at 17:41

## 2020-06-12 RX ADMIN — INSULIN LISPRO 6 UNITS: 100 INJECTION, SOLUTION INTRAVENOUS; SUBCUTANEOUS at 12:04

## 2020-06-12 RX ADMIN — SODIUM CHLORIDE 75 ML/HR: 9 INJECTION, SOLUTION INTRAVENOUS at 23:17

## 2020-06-12 RX ADMIN — ENOXAPARIN SODIUM 40 MG: 40 INJECTION SUBCUTANEOUS at 17:41

## 2020-06-12 RX ADMIN — INSULIN LISPRO 4 UNITS: 100 INJECTION, SOLUTION INTRAVENOUS; SUBCUTANEOUS at 07:38

## 2020-06-12 RX ADMIN — SODIUM CHLORIDE 75 ML/HR: 9 INJECTION, SOLUTION INTRAVENOUS at 10:03

## 2020-06-12 RX ADMIN — MAGNESIUM HYDROXIDE 10 ML: 2400 SUSPENSION ORAL at 15:41

## 2020-06-12 RX ADMIN — INSULIN LISPRO 6 UNITS: 100 INJECTION, SOLUTION INTRAVENOUS; SUBCUTANEOUS at 17:41

## 2020-06-12 RX ADMIN — BISACODYL 10 MG: 10 SUPPOSITORY RECTAL at 15:42

## 2020-06-12 RX ADMIN — INSULIN DETEMIR 15 UNITS: 100 INJECTION, SOLUTION SUBCUTANEOUS at 20:01

## 2020-06-12 NOTE — PLAN OF CARE
Problem: Patient Care Overview  Goal: Plan of Care Review  Outcome: Ongoing (interventions implemented as appropriate)  Flowsheets (Taken 6/12/2020 1800)  Progress: no change  Outcome Summary: highest temp today wa 100.0.  tylenol given.  patient continue with tenderness to lft side.  pain medication offered but refused.  up in chair all day.

## 2020-06-12 NOTE — PROGRESS NOTES
[facility]  Daily Progress Note  Ed Cifuentes Jr.  MRN: [id number] LOS: 2    Admit Date: 6/10/2020   6/12/2020 08:48    Subjective:          Chief Complaint:  Flank pain    Interval History:    Reviewed overnight events and nursing notes.   Status:  improving  Pain: Unchanged  Had low grade fever 100.3 last night     ROS:  10 point ROS obtained:   Gen: No fevers  HEENT: No migraine or visual change  Lung: No cough, hemoptysis or wheezing  Cv: No chest pain or pressure  Abd: No nausea or vomiting, no change in bowel function, no gi bleeding  Ext: No increased pain or swelling  Neuro: No seizure or syncope  Skin: No new rashes  Endocrine: No polyuria, polyphagia or polydipsia  Psych: No increased anxiety or depression  MS: No increase in joint pain or swelling reported    DIET:  Diet Regular; Consistent Carbohydrate    Medications:       sodium chloride 75 mL/hr Last Rate: 75 mL/hr (06/11/20 1937)       cefTRIAXone 1 g Intravenous Q24H   enoxaparin 40 mg Subcutaneous Q24H   insulin lispro 0-9 Units Subcutaneous TID AC   sodium chloride 10 mL Intravenous Q12H       Data:     Code Status:   Code Status and Medical Interventions:   Ordered at: 06/10/20 1653     Level Of Support Discussed With:    Patient     Code Status:    CPR     Medical Interventions (Level of Support Prior to Arrest):    Full       No family history on file.  Social History     Socioeconomic History   • Marital status:      Spouse name: Not on file   • Number of children: Not on file   • Years of education: Not on file   • Highest education level: Not on file   Tobacco Use   • Smoking status: Never Smoker   • Smokeless tobacco: Never Used       Labs:    Lab Results (last 72 hours)     Procedure Component Value Units Date/Time    Urine Culture - Urine, Urine, Clean Catch [[id number]]  (Normal) Collected:  06/10/20 2211    Specimen:  Urine, Clean Catch Updated:  06/11/20 1155     Urine Culture Culture in progress    POC  Glucose Once [[id number]]  (Abnormal) Collected:  06/11/20 0804    Specimen:  Blood Updated:  06/11/20 0816     Glucose 242 mg/dL      Comment: : [id number] Uri Calle ID: [id number]       Urinalysis With Culture If Indicated - Urine, Clean Catch [[id number]]  (Abnormal) Collected:  06/10/20 2211    Specimen:  Urine, Clean Catch Updated:  06/10/20 2228     Color, UA Yellow     Appearance, UA Clear     pH, UA <=5.0     Specific Gravity, UA >1.030     Glucose, UA Negative     Ketones, UA Trace     Bilirubin, UA Negative     Blood, UA Trace     Protein, UA Trace     Leuk Esterase, UA Small (1+)     Nitrite, UA Negative     Urobilinogen, UA 0.2 E.U./dL    Urinalysis, Microscopic Only - Urine, Clean Catch [[id number]]  (Abnormal) Collected:  06/10/20 2211    Specimen:  Urine, Clean Catch Updated:  06/10/20 2228     RBC, UA 3-5 /HPF      WBC, UA Too Numerous to Count /HPF      Bacteria, UA None Seen /HPF      Squamous Epithelial Cells, UA 3-6 /HPF      Hyaline Casts, UA 0-2 /LPF      Methodology Automated Microscopy    POC Glucose Once [[id number]]  (Abnormal) Collected:  06/10/20 2008    Specimen:  Blood Updated:  06/10/20 2027     Glucose 243 mg/dL      Comment: : [id number] Janee TylerMeter ID: [id number]       Urinalysis With Microscopic If Indicated (No Culture) - Urine, Clean Catch [[id number]]  (Abnormal) Collected:  06/10/20 1814    Specimen:  Urine, Clean Catch Updated:  06/10/20 1828     Color, UA Yellow     Appearance, UA Cloudy     pH, UA <=5.0     Specific Gravity, UA 1.018     Glucose,  mg/dL (Trace)     Ketones, UA Negative     Bilirubin, UA Negative     Blood, UA Small (1+)     Protein, UA 30 mg/dL (1+)     Leuk Esterase, UA Moderate (2+)     Nitrite, UA Negative     Urobilinogen, UA 0.2 E.U./dL    Urinalysis, Microscopic Only - Urine, Clean Catch [[id number]]  (Abnormal) Collected:  06/10/20 1814    Specimen:  Urine, Clean Catch Updated:  06/10/20 1828     RBC, UA 6-12 /HPF      WBC, UA  Too Numerous to Count /HPF      Bacteria, UA None Seen /HPF      Squamous Epithelial Cells, UA None Seen /HPF      Hyaline Casts, UA 3-6 /LPF      Methodology Automated Microscopy    Comprehensive Metabolic Panel [[id number]]  (Abnormal) Collected:  06/10/20 1748    Specimen:  Blood Updated:  06/10/20 1827     Glucose 279 mg/dL      BUN 27 mg/dL      Creatinine 1.05 mg/dL      Sodium 138 mmol/L      Potassium 3.6 mmol/L      Chloride 97 mmol/L      CO2 27.0 mmol/L      Calcium 9.0 mg/dL      Total Protein 7.1 g/dL      Albumin 3.70 g/dL      ALT (SGPT) 24 U/L      AST (SGOT) 12 U/L      Alkaline Phosphatase 142 U/L      Total Bilirubin 0.6 mg/dL      eGFR Non African Amer 71 mL/min/1.73      Globulin 3.4 gm/dL      A/G Ratio 1.1 g/dL      BUN/Creatinine Ratio 25.7     Anion Gap 14.0 mmol/L     Narrative:       GFR Normal >60  Chronic Kidney Disease <60  Kidney Failure <15      Lactic Acid, Plasma [[id number]]  (Normal) Collected:  06/10/20 1748    Specimen:  Blood Updated:  06/10/20 1816     Lactate 1.3 mmol/L     CBC & Differential [[id number]] Collected:  06/10/20 1748    Specimen:  Blood Updated:  06/10/20 1814    Narrative:       The following orders were created for panel order CBC & Differential.  Procedure                               Abnormality         Status                     ---------                               -----------         ------                     Manual Differential[[id number]]                                                         CBC Auto Differential[[id number]]        Abnormal            Final result                 Please view results for these tests on the individual orders.    CBC Auto Differential [[id number]]  (Abnormal) Collected:  06/10/20 1748    Specimen:  Blood Updated:  06/10/20 1814     WBC 17.20 10*3/mm3      RBC 4.04 10*6/mm3      Hemoglobin 11.5 g/dL      Hematocrit 34.1 %      MCV 84.4 fL      MCH 28.5 pg      MCHC 33.7 g/dL      RDW 12.9 %      RDW-SD 39.3 fl      MPV 11.1  "fL      Platelets 241 10*3/mm3      Neutrophil % 84.4 %      Lymphocyte % 9.2 %      Monocyte % 5.1 %      Eosinophil % 0.1 %      Basophil % 0.4 %      Neutrophils, Absolute 14.51 10*3/mm3      Lymphocytes, Absolute 1.58 10*3/mm3      Monocytes, Absolute 0.88 10*3/mm3      Eosinophils, Absolute 0.02 10*3/mm3      Basophils, Absolute 0.07 10*3/mm3     Hemoglobin A1c [[id number]]  (Abnormal) Collected:  06/10/20 1748    Specimen:  Blood Updated:  06/10/20 1813     Hemoglobin A1C 10.20 %     Narrative:       Hemoglobin A1C Ranges:    Increased Risk for Diabetes  5.7% to 6.4%  Diabetes                     >= 6.5%  Diabetic Goal                < 7.0%    Blood Culture - Blood, Arm, Right [[id number]] Collected:  06/10/20 1748    Specimen:  Blood from Arm, Right Updated:  06/10/20 1800    Blood Culture - Blood, Arm, Left [[id number]] Collected:  06/10/20 1748    Specimen:  Blood from Arm, Left Updated:  06/10/20 1759      BC Neg after 24 hours     Urine Culture - Urine, Urine, Clean Catch   Order: [id number] - Reflex for Order [id number]   Status:  Final result   Visible to patient:  No (Not Released)   Next appt:  None   Specimen Information: Urine, Clean Catch        Urine Culture 25,000 CFU/mL Mixed Jessi Isolated          Specimen contains mixed organisms of questionable pathogenicity which indicates contamination with commensal jessi.  Further identification is unlikely to provide clinically useful information.  Suggest recollection.      Resulting Agency: [facility]         Specimen Collected: 06/10/20 22:11   Last Resulted: 06/12/20 03:17               Objective:     Vitals: /82 (BP Location: Left arm, Patient Position: Lying)   Pulse 80   Temp 99.3 °F (37.4 °C) (Oral)   Resp 16   Ht 175.3 cm (69\")   Wt 130 kg (286 lb 9.6 oz)   SpO2 94%   BMI 42.32 kg/m²      Intake/Output Summary (Last 24 hours) at 6/12/2020 0848  Last data filed at 6/12/2020 0458  Gross per 24 hour   Intake 1583 ml   Output --   Net 1583 " ml    Temp (24hrs), Av.2 °F (37.3 °C), Min:98.4 °F (36.9 °C), Max:100.3 °F (37.9 °C)        Physical Examination:   General appearance - alert, well appearing, and in no distress and oriented to person, place, and time  Mental status - alert, oriented to person, place, and time, normal mood, behavior, speech, dress, motor activity, and thought processes  Eyes - pupils equal and reactive, extraocular eye movements intact  Ears - bilateral TM's and external ear canals normal, hearing grossly normal bilaterally  Mouth - mucous membranes moist, pharynx normal without lesions  Neck - supple, no significant adenopathy  Lymphatics - no palpable lymphadenopathy, no hepatosplenomegaly  Chest - clear to auscultation, no wheezes, rales or rhonchi, symmetric air entry, no tachypnea, retractions or cyanosis  Heart - normal rate, regular rhythm, normal S1, S2, no murmurs, rubs, clicks or gallops  Abdomen - soft, pain @ LLQ W Palp, nondistended, no masses or organomegaly bowel sounds normal  Neurological - alert, oriented, normal speech, no focal findings or movement disorder noted  Musculoskeletal - no joint tenderness, deformity or swelling  Extremities - peripheral pulses normal, no pedal edema, no clubbing or cyanosis  Skin - normal coloration and turgor, no rashes, no suspicious skin lesions noted      Assessment and Plan:     Primary Problem:  Acute pyelonephritis    Hospital Problem list:    Acute pyelonephritis    Type 2 diabetes mellitus with hyperglycemia (CMS/HCC)    Pyelonephritis      PMH:  Past Medical History:   Diagnosis Date   • Diabetes mellitus (CMS/HCC)    • Hyperlipidemia        Treatment Plan:  Has persistant LLQ pain & low grade fever last evening  Recheck CBC/CRP this AM   urine culture as above- indetermanent  Continue IVF/IV analgesia  Continue IV rocephin  Further treatment / disposition  Rec per Dr Diaz's    Discharge planning:   Home:   Lives in [facility] urology care in  [place] or [place].      Reviewed treatment plans with the patient and/or family.   20 minutes spent in face to face interaction and coordination of care.     Electronically signed by ALISE Dong on 6/12/2020 at 08:48

## 2020-06-12 NOTE — PLAN OF CARE
Problem: Patient Care Overview  Goal: Plan of Care Review  Outcome: Ongoing (interventions implemented as appropriate)  Flowsheets  Taken 6/11/2020 0102  Progress: no change  Taken 6/12/2020 0157  Plan of Care Reviewed With: patient  Outcome Summary: Max temp 100.3.  Tylenol given.  Dry, non-productive cough noted. Continues to c/o lower abdominal soreness when coughing or moving.  IVF infusing.  Monitor.

## 2020-06-13 LAB
BASOPHILS # BLD AUTO: 0.03 10*3/MM3 (ref 0–0.2)
BASOPHILS NFR BLD AUTO: 0.2 % (ref 0–1.5)
DEPRECATED RDW RBC AUTO: 38.6 FL (ref 37–54)
EOSINOPHIL # BLD AUTO: 0.15 10*3/MM3 (ref 0–0.4)
EOSINOPHIL NFR BLD AUTO: 1.2 % (ref 0.3–6.2)
ERYTHROCYTE [DISTWIDTH] IN BLOOD BY AUTOMATED COUNT: 12.5 % (ref 12.3–15.4)
GLUCOSE BLDC GLUCOMTR-MCNC: 201 MG/DL (ref 70–130)
GLUCOSE BLDC GLUCOMTR-MCNC: 236 MG/DL (ref 70–130)
GLUCOSE BLDC GLUCOMTR-MCNC: 245 MG/DL (ref 70–130)
GLUCOSE BLDC GLUCOMTR-MCNC: 247 MG/DL (ref 70–130)
HCT VFR BLD AUTO: 32.2 % (ref 37.5–51)
HGB BLD-MCNC: 10.9 G/DL (ref 13–17.7)
IMM GRANULOCYTES # BLD AUTO: 0.1 10*3/MM3 (ref 0–0.05)
IMM GRANULOCYTES NFR BLD AUTO: 0.8 % (ref 0–0.5)
LYMPHOCYTES # BLD AUTO: 0.87 10*3/MM3 (ref 0.7–3.1)
LYMPHOCYTES NFR BLD AUTO: 7.1 % (ref 19.6–45.3)
MCH RBC QN AUTO: 28.8 PG (ref 26.6–33)
MCHC RBC AUTO-ENTMCNC: 33.9 G/DL (ref 31.5–35.7)
MCV RBC AUTO: 85 FL (ref 79–97)
MONOCYTES # BLD AUTO: 0.7 10*3/MM3 (ref 0.1–0.9)
MONOCYTES NFR BLD AUTO: 5.7 % (ref 5–12)
NEUTROPHILS # BLD AUTO: 10.4 10*3/MM3 (ref 1.7–7)
NEUTROPHILS NFR BLD AUTO: 85 % (ref 42.7–76)
NRBC BLD AUTO-RTO: 0 /100 WBC (ref 0–0.2)
PLATELET # BLD AUTO: 196 10*3/MM3 (ref 140–450)
PMV BLD AUTO: 11.8 FL (ref 6–12)
RBC # BLD AUTO: 3.79 10*6/MM3 (ref 4.14–5.8)
WBC NRBC COR # BLD: 12.25 10*3/MM3 (ref 3.4–10.8)

## 2020-06-13 PROCEDURE — 82962 GLUCOSE BLOOD TEST: CPT

## 2020-06-13 PROCEDURE — 99232 SBSQ HOSP IP/OBS MODERATE 35: CPT | Performed by: UROLOGY

## 2020-06-13 PROCEDURE — 25010000002 ENOXAPARIN PER 10 MG: Performed by: NURSE PRACTITIONER

## 2020-06-13 PROCEDURE — 85025 COMPLETE CBC W/AUTO DIFF WBC: CPT | Performed by: PHYSICIAN ASSISTANT

## 2020-06-13 PROCEDURE — 63710000001 INSULIN LISPRO (HUMAN) PER 5 UNITS: Performed by: FAMILY MEDICINE

## 2020-06-13 PROCEDURE — 25010000002 CEFTRIAXONE PER 250 MG: Performed by: NURSE PRACTITIONER

## 2020-06-13 PROCEDURE — 63710000001 INSULIN DETEMIR PER 5 UNITS: Performed by: FAMILY MEDICINE

## 2020-06-13 RX ORDER — DEXTROSE MONOHYDRATE 25 G/50ML
25 INJECTION, SOLUTION INTRAVENOUS
Status: DISCONTINUED | OUTPATIENT
Start: 2020-06-13 | End: 2020-06-15 | Stop reason: HOSPADM

## 2020-06-13 RX ORDER — NICOTINE POLACRILEX 4 MG
15 LOZENGE BUCCAL
Status: DISCONTINUED | OUTPATIENT
Start: 2020-06-13 | End: 2020-06-15 | Stop reason: HOSPADM

## 2020-06-13 RX ORDER — LISINOPRIL 5 MG/1
5 TABLET ORAL
Status: DISCONTINUED | OUTPATIENT
Start: 2020-06-13 | End: 2020-06-13

## 2020-06-13 RX ADMIN — INSULIN LISPRO 4 UNITS: 100 INJECTION, SOLUTION INTRAVENOUS; SUBCUTANEOUS at 17:30

## 2020-06-13 RX ADMIN — METFORMIN HYDROCHLORIDE 500 MG: 500 TABLET ORAL at 08:23

## 2020-06-13 RX ADMIN — SODIUM CHLORIDE 75 ML/HR: 9 INJECTION, SOLUTION INTRAVENOUS at 12:34

## 2020-06-13 RX ADMIN — INSULIN LISPRO 4 UNITS: 100 INJECTION, SOLUTION INTRAVENOUS; SUBCUTANEOUS at 12:34

## 2020-06-13 RX ADMIN — INSULIN DETEMIR 25 UNITS: 100 INJECTION, SOLUTION SUBCUTANEOUS at 21:41

## 2020-06-13 RX ADMIN — LOSARTAN POTASSIUM: 50 TABLET, FILM COATED ORAL at 08:23

## 2020-06-13 RX ADMIN — ENOXAPARIN SODIUM 40 MG: 40 INJECTION SUBCUTANEOUS at 17:30

## 2020-06-13 RX ADMIN — INSULIN LISPRO 4 UNITS: 100 INJECTION, SOLUTION INTRAVENOUS; SUBCUTANEOUS at 08:23

## 2020-06-13 RX ADMIN — METFORMIN HYDROCHLORIDE 500 MG: 500 TABLET ORAL at 17:30

## 2020-06-13 RX ADMIN — CEFTRIAXONE SODIUM 1 G: 1 INJECTION, POWDER, FOR SOLUTION INTRAMUSCULAR; INTRAVENOUS at 17:30

## 2020-06-13 RX ADMIN — ACETAMINOPHEN 650 MG: 325 TABLET, FILM COATED ORAL at 21:41

## 2020-06-13 NOTE — PLAN OF CARE
VSS, up ad eligio, voiding, room air, urine clear - dark yellow, IVF and abx as ordered, plans for likely discharge home on Monday on PO antibiotics    Problem: Patient Care Overview  Goal: Plan of Care Review  Outcome: Ongoing (interventions implemented as appropriate)  Flowsheets  Taken 6/13/2020 1533 by Christen De Oliveira RN  Progress: improving  Taken 6/13/2020 1457 by Alina Black  Plan of Care Reviewed With: patient

## 2020-06-13 NOTE — PLAN OF CARE
Problem: Patient Care Overview  Goal: Plan of Care Review  Outcome: Ongoing (interventions implemented as appropriate)  Flowsheets (Taken 6/13/2020 7647)  Plan of Care Reviewed With: patient  Note:   Pt with A1c 10.2%, he reports he does not really follow a diabetic diet. He states that he does try to stay away from sweets and he drinks beverages sweetened with splenda or diet vs regular. He states previously he has had his A1c down to 7 and did well with being compliant with medications and diet. He understands that he needs to get back on track. We discussed current eating pattern and adjustments he could make in order to aid with getting his A1c down. He currently lacks consistency and aims to work on this. Allowed time for questions, encouraged diet compliance.Will continue to follow.

## 2020-06-13 NOTE — PROGRESS NOTES
Ed Cifuentes Jr. is a 64 y.o. male patient.      Patient feels much better today.  Less flank pain.  Urine clearing up.  Voiding on his own.          Current Facility-Administered Medications   Medication Dose Route Frequency Provider Last Rate Last Dose   • acetaminophen (TYLENOL) tablet 650 mg  650 mg Oral Q4H PRN Miguel Ángel Saul MD   650 mg at 06/12/20 1559   • bisacodyl (DULCOLAX) suppository 10 mg  10 mg Rectal Daily Lalo Carrington PA   10 mg at 06/12/20 1542   • cefTRIAXone (ROCEPHIN) 1 g/100 mL 0.9% NS (MBP)  1 g Intravenous Q24H Emilia Bender APRN   1 g at 06/12/20 1741   • dextrose (D50W) 25 g/ 50mL Intravenous Solution 25 g  25 g Intravenous Q15 Min PRN Emilia Bender APRN       • dextrose (D50W) 25 g/ 50mL Intravenous Solution 25 g  25 g Intravenous Q15 Min PRN Miguel Ángel Medina MD       • dextrose (GLUTOSE) oral gel 15 g  15 g Oral Q15 Min PRN Emilia Bender APRN       • dextrose (GLUTOSE) oral gel 15 g  15 g Oral Q15 Min PRN Miguel Ángel Medina MD       • enoxaparin (LOVENOX) syringe 40 mg  40 mg Subcutaneous Q24H Emilia Bender APRN   40 mg at 06/12/20 1741   • glucagon (human recombinant) (GLUCAGEN DIAGNOSTIC) injection 1 mg  1 mg Subcutaneous Q15 Min PRN Emilia Bender APRN       • glucagon (human recombinant) (GLUCAGEN DIAGNOSTIC) injection 1 mg  1 mg Subcutaneous Q15 Min PRN Miguel Ángel Medina MD       • HYDROcodone-acetaminophen (NORCO) 5-325 MG per tablet 1 tablet  1 tablet Oral Q4H PRN Emilia Bender APRN       • insulin detemir (LEVEMIR) injection 25 Units  25 Units Subcutaneous Nightly Miguel Ángel Medina MD       • insulin lispro (humaLOG) injection 0-9 Units  0-9 Units Subcutaneous TID AC Emilia Bender APRN   6 Units at 06/12/20 1741   • insulin lispro (humaLOG) injection 0-9 Units  0-9 Units Subcutaneous TID AC Miguel Ángel Medina MD       • losartan (COZAAR) 50 mg, hydroCHLOROthiazide (HYDRODIURIL) 12.5 mg for  "HYZAAR 50-12.5   Oral Daily Miguel Ángel Medina MD       • magnesium hydroxide (MILK OF MAGNESIA) suspension 2400 mg/10mL 10 mL  10 mL Oral Daily PRN Lalo Carrington PA   10 mL at 06/12/20 1541   • metFORMIN (GLUCOPHAGE) tablet 500 mg  500 mg Oral BID With Meals Miguel Ángel Medina MD       • ondansetron (ZOFRAN) tablet 4 mg  4 mg Oral Q6H PRN Emilia Bender APRN        Or   • ondansetron (ZOFRAN) injection 4 mg  4 mg Intravenous Q6H PRN Emilia Bender APRN       • sodium chloride 0.9 % flush 10 mL  10 mL Intravenous Q12H Emilia Bender APRN   10 mL at 06/12/20 0738   • sodium chloride 0.9 % flush 10 mL  10 mL Intravenous PRN Emilia Bender APRN       • sodium chloride 0.9 % infusion  75 mL/hr Intravenous Continuous Emilia Bender APRN 75 mL/hr at 06/12/20 2317 75 mL/hr at 06/12/20 2317     ALLERGIES:  No Known Allergies    Acute pyelonephritis    Type 2 diabetes mellitus with hyperglycemia (CMS/[facility])    Pyelonephritis    Blood pressure 141/70, pulse 74, temperature 98.8 °F (37.1 °C), temperature source Oral, resp. rate 16, height 175.3 cm (69\"), weight 130 kg (286 lb 9.6 oz), SpO2 96 %.      Subjective:  Symptoms:  Stable.    Diet:  Adequate intake.    Activity level: Returning to normal.    Pain:  He complains of pain that is mild.  He reports pain is improving.  Pain is well controlled.      Review of Systems  Objective:  General Appearance:  Comfortable and well-appearing.    Vital signs: (most recent): Blood pressure 141/70, pulse 74, temperature 98.8 °F (37.1 °C), temperature source Oral, resp. rate 16, height 175.3 cm (69\"), weight 130 kg (286 lb 9.6 oz), SpO2 96 %.  Vital signs are normal.    Output: Producing urine and minimal stool output.    HEENT: Normal HEENT exam.    Lungs:  Normal effort and normal respiratory rate.    Heart: Normal rate.  Regular rhythm.    Abdomen: Abdomen is soft.  (Obese).  There is generalized tenderness.     Extremities: Normal range of " motion.    Skin:  Warm and dry.              Labs:  Lab Results (last 72 hours)     Procedure Component Value Units Date/Time    POC Glucose Once [[id number]]  (Abnormal) Collected:  06/12/20 1958    Specimen:  Blood Updated:  06/12/20 2019     Glucose 298 mg/dL      Comment: : [id number] Janee TylerMeter ID: [id number]       Blood Culture - Blood, Arm, Right [[id number]] Collected:  06/10/20 1748    Specimen:  Blood from Arm, Right Updated:  06/12/20 1801     Blood Culture No growth at 2 days    Blood Culture - Blood, Arm, Left [[id number]] Collected:  06/10/20 1748    Specimen:  Blood from Arm, Left Updated:  06/12/20 1801     Blood Culture No growth at 2 days    POC Glucose Once [[id number]]  (Abnormal) Collected:  06/12/20 1639    Specimen:  Blood Updated:  06/12/20 1649     Glucose 282 mg/dL      Comment: : [id number] Uri Cronin  GMeter ID: [id number]       POC Glucose Once [[id number]]  (Abnormal) Collected:  06/12/20 1159    Specimen:  Blood Updated:  06/12/20 1211     Glucose 256 mg/dL      Comment: : [id number] Uri Roseanne  GMeter ID: [id number]       Comprehensive Metabolic Panel [[id number]]  (Abnormal) Collected:  06/12/20 1044    Specimen:  Blood Updated:  06/12/20 1107     Glucose 327 mg/dL      BUN 12 mg/dL      Creatinine 0.86 mg/dL      Sodium 138 mmol/L      Potassium 3.9 mmol/L      Chloride 100 mmol/L      CO2 26.0 mmol/L      Calcium 8.7 mg/dL      Total Protein 6.8 g/dL      Albumin 3.20 g/dL      ALT (SGPT) 46 U/L      AST (SGOT) 45 U/L      Alkaline Phosphatase 183 U/L      Total Bilirubin 0.4 mg/dL      eGFR Non African Amer 90 mL/min/1.73      Globulin 3.6 gm/dL      A/G Ratio 0.9 g/dL      BUN/Creatinine Ratio 14.0     Anion Gap 12.0 mmol/L     Narrative:       GFR Normal >60  Chronic Kidney Disease <60  Kidney Failure <15      C-reactive Protein [[id number]]  (Abnormal) Collected:  06/12/20 1044    Specimen:  Blood Updated:  06/12/20 1104     C-Reactive Protein 27.41 mg/dL      CBC & Differential [[id number]] Collected:  06/12/20 1029    Specimen:  Blood Updated:  06/12/20 1048    Narrative:       The following orders were created for panel order CBC & Differential.  Procedure                               Abnormality         Status                     ---------                               -----------         ------                     CBC Auto Differential[[id number]]        Abnormal            Final result                 Please view results for these tests on the individual orders.    CBC Auto Differential [[id number]]  (Abnormal) Collected:  06/12/20 1029    Specimen:  Blood Updated:  06/12/20 1048     WBC 12.20 10*3/mm3      RBC 4.08 10*6/mm3      Hemoglobin 11.5 g/dL      Hematocrit 34.9 %      MCV 85.5 fL      MCH 28.2 pg      MCHC 33.0 g/dL      RDW 12.7 %      RDW-SD 39.6 fl      MPV 11.1 fL      Platelets 225 10*3/mm3      Neutrophil % 82.8 %      Lymphocyte % 9.0 %      Monocyte % 5.4 %      Eosinophil % 1.5 %      Basophil % 0.4 %      Immature Grans % 0.9 %      Neutrophils, Absolute 10.10 10*3/mm3      Lymphocytes, Absolute 1.10 10*3/mm3      Monocytes, Absolute 0.66 10*3/mm3      Eosinophils, Absolute 0.18 10*3/mm3      Basophils, Absolute 0.05 10*3/mm3      Immature Grans, Absolute 0.11 10*3/mm3      nRBC 0.0 /100 WBC     POC Glucose Once [[id number]]  (Abnormal) Collected:  06/12/20 0725    Specimen:  Blood Updated:  06/12/20 0736     Glucose 201 mg/dL      Comment: : [id number] Uri Calle ID: [id number]       Urine Culture - Urine, Urine, Clean Catch [[id number]] Collected:  06/10/20 2211    Specimen:  Urine, Clean Catch Updated:  06/12/20 0317     Urine Culture 25,000 CFU/mL Mixed Jessi Isolated    Narrative:       Specimen contains mixed organisms of questionable pathogenicity which indicates contamination with commensal jessi.  Further identification is unlikely to provide clinically useful information.  Suggest recollection.    POC Glucose Once [[id number]]   (Abnormal) Collected:  06/11/20 1707    Specimen:  Blood Updated:  06/11/20 1719     Glucose 239 mg/dL      Comment: : [id number] Uri Cronin  GMeter ID: [id number]       POC Glucose Once [[id number]]  (Abnormal) Collected:  06/11/20 1326    Specimen:  Blood Updated:  06/11/20 1404     Glucose 268 mg/dL      Comment: : [id number] Uri Cronin  GMeter ID: [id number]       POC Glucose Once [[id number]]  (Abnormal) Collected:  06/11/20 0804    Specimen:  Blood Updated:  06/11/20 0816     Glucose 242 mg/dL      Comment: : [id number] Uri Cronin  GMeter ID: [id number]       Urinalysis With Culture If Indicated - Urine, Clean Catch [[id number]]  (Abnormal) Collected:  06/10/20 2211    Specimen:  Urine, Clean Catch Updated:  06/10/20 2228     Color, UA Yellow     Appearance, UA Clear     pH, UA <=5.0     Specific Gravity, UA >1.030     Glucose, UA Negative     Ketones, UA Trace     Bilirubin, UA Negative     Blood, UA Trace     Protein, UA Trace     Leuk Esterase, UA Small (1+)     Nitrite, UA Negative     Urobilinogen, UA 0.2 E.U./dL    Urinalysis, Microscopic Only - Urine, Clean Catch [[id number]]  (Abnormal) Collected:  06/10/20 2211    Specimen:  Urine, Clean Catch Updated:  06/10/20 2228     RBC, UA 3-5 /HPF      WBC, UA Too Numerous to Count /HPF      Bacteria, UA None Seen /HPF      Squamous Epithelial Cells, UA 3-6 /HPF      Hyaline Casts, UA 0-2 /LPF      Methodology Automated Microscopy    POC Glucose Once [[id number]]  (Abnormal) Collected:  06/10/20 2008    Specimen:  Blood Updated:  06/10/20 2027     Glucose 243 mg/dL      Comment: : [id number] Janee NayeliMeter ID: [id number]       Urinalysis With Microscopic If Indicated (No Culture) - Urine, Clean Catch [[id number]]  (Abnormal) Collected:  06/10/20 1814    Specimen:  Urine, Clean Catch Updated:  06/10/20 1828     Color, UA Yellow     Appearance, UA Cloudy     pH, UA <=5.0     Specific Gravity, UA 1.018     Glucose,  mg/dL (Trace)      Ketones, UA Negative     Bilirubin, UA Negative     Blood, UA Small (1+)     Protein, UA 30 mg/dL (1+)     Leuk Esterase, UA Moderate (2+)     Nitrite, UA Negative     Urobilinogen, UA 0.2 E.U./dL    Urinalysis, Microscopic Only - Urine, Clean Catch [[id number]]  (Abnormal) Collected:  06/10/20 1814    Specimen:  Urine, Clean Catch Updated:  06/10/20 1828     RBC, UA 6-12 /HPF      WBC, UA Too Numerous to Count /HPF      Bacteria, UA None Seen /HPF      Squamous Epithelial Cells, UA None Seen /HPF      Hyaline Casts, UA 3-6 /LPF      Methodology Automated Microscopy    Comprehensive Metabolic Panel [[id number]]  (Abnormal) Collected:  06/10/20 1748    Specimen:  Blood Updated:  06/10/20 1827     Glucose 279 mg/dL      BUN 27 mg/dL      Creatinine 1.05 mg/dL      Sodium 138 mmol/L      Potassium 3.6 mmol/L      Chloride 97 mmol/L      CO2 27.0 mmol/L      Calcium 9.0 mg/dL      Total Protein 7.1 g/dL      Albumin 3.70 g/dL      ALT (SGPT) 24 U/L      AST (SGOT) 12 U/L      Alkaline Phosphatase 142 U/L      Total Bilirubin 0.6 mg/dL      eGFR Non African Amer 71 mL/min/1.73      Globulin 3.4 gm/dL      A/G Ratio 1.1 g/dL      BUN/Creatinine Ratio 25.7     Anion Gap 14.0 mmol/L     Narrative:       GFR Normal >60  Chronic Kidney Disease <60  Kidney Failure <15      Lactic Acid, Plasma [[id number]]  (Normal) Collected:  06/10/20 1748    Specimen:  Blood Updated:  06/10/20 1816     Lactate 1.3 mmol/L     CBC & Differential [[id number]] Collected:  06/10/20 1748    Specimen:  Blood Updated:  06/10/20 1814    Narrative:       The following orders were created for panel order CBC & Differential.  Procedure                               Abnormality         Status                     ---------                               -----------         ------                     Manual Differential[[id number]]                                                         CBC Auto Differential[[id number]]        Abnormal            Final result                  Please view results for these tests on the individual orders.    CBC Auto Differential [[id number]]  (Abnormal) Collected:  06/10/20 1748    Specimen:  Blood Updated:  06/10/20 1814     WBC 17.20 10*3/mm3      RBC 4.04 10*6/mm3      Hemoglobin 11.5 g/dL      Hematocrit 34.1 %      MCV 84.4 fL      MCH 28.5 pg      MCHC 33.7 g/dL      RDW 12.9 %      RDW-SD 39.3 fl      MPV 11.1 fL      Platelets 241 10*3/mm3      Neutrophil % 84.4 %      Lymphocyte % 9.2 %      Monocyte % 5.1 %      Eosinophil % 0.1 %      Basophil % 0.4 %      Neutrophils, Absolute 14.51 10*3/mm3      Lymphocytes, Absolute 1.58 10*3/mm3      Monocytes, Absolute 0.88 10*3/mm3      Eosinophils, Absolute 0.02 10*3/mm3      Basophils, Absolute 0.07 10*3/mm3     Hemoglobin A1c [[id number]]  (Abnormal) Collected:  06/10/20 1748    Specimen:  Blood Updated:  06/10/20 1813     Hemoglobin A1C 10.20 %     Narrative:       Hemoglobin A1C Ranges:    Increased Risk for Diabetes  5.7% to 6.4%  Diabetes                     >= 6.5%  Diabetic Goal                < 7.0%          Imaging Results (Last 72 Hours)     Procedure Component Value Units Date/Time    CT Abdomen Pelvis With Contrast [[id number]] Collected:  06/10/20 2007     Updated:  06/10/20 2022    Narrative:       EXAMINATION: CT ABDOMEN PELVIS W CONTRAST-  6/10/2020 8:07 PM CDT     HISTORY: Pyelonephritis     COMPARISON:None     TECHNIQUE:  Radiation dose equals DLP 1122 mGy-cm.  Automated exposure  control dose reduction technique was implemented.     Thin section axial imaging was obtained. 2-D sagittal and coronal  reconstruction images were generated.     Intravenous contrast was administered.     Oral contrast was ingested.     FINDINGS:     URINARY TRACT FINDINGS:     There is mild patchy contrast enhancement of the left kidney  particularly in the interpolar region compatible with pyelonephritis.  There are multiple nephrogenic cysts identified. There is mild  perinephric fat stranding  with fluid in the anterior pararenal space  with thickening of the fascia. There is no left-sided pelvocaliectasis  is mild stranding along the left ureter. There are no urinary tract  calculi.     The right kidney enhances symmetrically. Small cortical cysts noted in  the upper pole. There is no right sided pelvocaliectasis or hydroureter.  There is mild stranding of the distal right ureter, false pelvis level.     The urinary bladder is incompletely distended with focal masslike  nodularity in the posterior urinary ladder base most likely representing  superior nodular hypertrophy of the prostate with resulting in mass  effect. Correlate with patient presentation, urology consultation  suggested.. The prostate gland measures approximately 6.4 cm in  craniocaudal projection, assuming superior prostate nodularity and 5 cm  in greatest transverse projection.     ADDITIONAL FINDINGS:     Lung bases are grossly clear.     The posterior medial right lobe of the liver there is ill-defined  nodular enhancement which may represent a hemangioma. Ultrasound may  further characterize.     No additional hepatic lesions observed. There is no CT evidence of  gallstones. The hepatic venous, portal venous and biliary tree are  imaged appropriately.     The spleen is not enlarged.     There are no adrenal masses.     There are no focal pancreatic lesions with fatty infiltration observed  diffusely.     There is stool and gas identified throughout the colon which is  nondilated. The appendix is surgically absent.     Small bowel is not dilated.     The duodenal sweep imaged appropriately.     The stomach is decompressed.     Celiac axis, SMA and ANDER are intact and uncompromised.     There is mild atherosclerotic aortoiliac and femoral calcifications.     There are no pathologically enlarged lymph nodes.     There is no ascites or pneumoperitoneum.     A small left fat-containing inguinal hernia identified.     There is advanced  degenerative changes noted in the left hip with coxa  magna deformity.     Degenerative changes noted in the SI joints and thoracolumbar spine. No  acute osseous abnormalities identified.       Impression:       1. Patchy parenchymal enhancement of the left kidney with perinephric  and periureteral fat stranding compatible with pyelonephritis. Mild  right-sided distal periureteral stranding, again infectious/inflammatory  change, pyelonephritis. Bilateral nephrogenic cysts.  2. Probable nodular enlargement of the superior prostate with resulting  mass effect on the urinary bladder base as described above. Urology  consultation recommended.  3. Degenerative changes in the left hip, SI joints and thoracolumbar  spine.  4. Ill-defined enhancement right lobe of the liver, posteriorly and  medially, differential considerations include hemangioma. Ultrasound may  further characterize.  This report was finalized on 06/10/2020 20:18 by Dr. Yinka Bneder MD.                Assessment:    Condition: In stable condition.  Improving.   (    Type 2 diabetes- will start Glucophage with body habitus, add ACE inhibitor as well.  Patient will need statin therapy on an outpatient basis.  Change diet and consult diabetic educator today.  Poor disease insight.    Pyelonephritis/urinary tract infection present on admission- white count trending down, symptomatic improvement.  Urology following.  Can change to oral antibiotics tomorrow and discharge home Monday morning.      Right lobe of liver with ill-defined enhancement- ultrasound when discharged, CT reviewed enlarged prostate noted.  Will need urology follow-up outpatient basis.).     Patient Active Problem List   Diagnosis   • Acute pyelonephritis   • Type 2 diabetes mellitus with hyperglycemia (CMS/HCC)   • Pyelonephritis     Miguel Ángel Medina MD  6/13/2020

## 2020-06-13 NOTE — PROGRESS NOTES
Urology  Length of Stay: 2  Patient Care Team:  Provider, No Known as PCP - General (Family Medicine)    Chief Complaint: Follow-up presumed pyelonephritis    Subjective     Interval History:   Continues to have left flank and left lower quadrant pain.  Says it is constant.   Review of Systems:   Review of Systems  Constitutional: Negative for chills But has low-grade fever  Gastrointestinal: Negative for Hematochezia or nausea.  Does have abdominal pain  Genitourinary: Negative for hematuria or dysuria.  Positive for flank pain  Objective       Intake/Output Summary (Last 24 hours) at 6/12/2020 1904  Last data filed at 6/12/2020 1000  Gross per 24 hour   Intake 1960.5 ml   Output --   Net 1960.5 ml       Vital Signs  Temp:  [98.4 °F (36.9 °C)-100.3 °F (37.9 °C)] 98.7 °F (37.1 °C)  Heart Rate:  [65-82] 74  Resp:  [16-18] 16  BP: (125-143)/(70-82) 141/70    Physical Exam:  Physical Exam  Constitutional:  They  appear well-developed and well-nourished. There are no obvious deformities. No distress. The vital signs are reviewed  Pulmonary/Chest: Effort normal.   GI: Round contour.  The patient exhibits no distension and no mass. There is moderate left flank tenderness. There is no rebound and no guarding. No hernia.   Neurological: Patient is alert and oriented to person, place, and time.   Skin: Skin is warm and dry. Not diaphoretic.   Psychiatric:  normal mood and affect. Not agitated.        Results Review:       I reviewed the patient's new clinical results.  Lab Results (last 24 hours)     Procedure Component Value Units Date/Time    Blood Culture - Blood, Arm, Right [[id number]] Collected:  06/10/20 1748    Specimen:  Blood from Arm, Right Updated:  06/12/20 1801     Blood Culture No growth at 2 days    Blood Culture - Blood, Arm, Left [[id number]] Collected:  06/10/20 1748    Specimen:  Blood from Arm, Left Updated:  06/12/20 1801     Blood Culture No growth at 2 days    POC Glucose Once [[id number]]  (Abnormal)  Collected:  06/12/20 1639    Specimen:  Blood Updated:  06/12/20 1649     Glucose 282 mg/dL      Comment: : [id number] Uri Cronin  GMeter ID: [id number]       POC Glucose Once [[id number]]  (Abnormal) Collected:  06/12/20 1159    Specimen:  Blood Updated:  06/12/20 1211     Glucose 256 mg/dL      Comment: : [id number] Uri Cronin  GMeter ID: [id number]       Comprehensive Metabolic Panel [[id number]]  (Abnormal) Collected:  06/12/20 1044    Specimen:  Blood Updated:  06/12/20 1107     Glucose 327 mg/dL      BUN 12 mg/dL      Creatinine 0.86 mg/dL      Sodium 138 mmol/L      Potassium 3.9 mmol/L      Chloride 100 mmol/L      CO2 26.0 mmol/L      Calcium 8.7 mg/dL      Total Protein 6.8 g/dL      Albumin 3.20 g/dL      ALT (SGPT) 46 U/L      AST (SGOT) 45 U/L      Alkaline Phosphatase 183 U/L      Total Bilirubin 0.4 mg/dL      eGFR Non African Amer 90 mL/min/1.73      Globulin 3.6 gm/dL      A/G Ratio 0.9 g/dL      BUN/Creatinine Ratio 14.0     Anion Gap 12.0 mmol/L     Narrative:       GFR Normal >60  Chronic Kidney Disease <60  Kidney Failure <15      C-reactive Protein [[id number]]  (Abnormal) Collected:  06/12/20 1044    Specimen:  Blood Updated:  06/12/20 1104     C-Reactive Protein 27.41 mg/dL     CBC & Differential [[id number]] Collected:  06/12/20 1029    Specimen:  Blood Updated:  06/12/20 1048    Narrative:       The following orders were created for panel order CBC & Differential.  Procedure                               Abnormality         Status                     ---------                               -----------         ------                     CBC Auto Differential[[id number]]        Abnormal            Final result                 Please view results for these tests on the individual orders.    CBC Auto Differential [[id number]]  (Abnormal) Collected:  06/12/20 1029    Specimen:  Blood Updated:  06/12/20 1048     WBC 12.20 10*3/mm3      RBC 4.08 10*6/mm3      Hemoglobin 11.5 g/dL       Hematocrit 34.9 %      MCV 85.5 fL      MCH 28.2 pg      MCHC 33.0 g/dL      RDW 12.7 %      RDW-SD 39.6 fl      MPV 11.1 fL      Platelets 225 10*3/mm3      Neutrophil % 82.8 %      Lymphocyte % 9.0 %      Monocyte % 5.4 %      Eosinophil % 1.5 %      Basophil % 0.4 %      Immature Grans % 0.9 %      Neutrophils, Absolute 10.10 10*3/mm3      Lymphocytes, Absolute 1.10 10*3/mm3      Monocytes, Absolute 0.66 10*3/mm3      Eosinophils, Absolute 0.18 10*3/mm3      Basophils, Absolute 0.05 10*3/mm3      Immature Grans, Absolute 0.11 10*3/mm3      nRBC 0.0 /100 WBC     POC Glucose Once [[id number]]  (Abnormal) Collected:  06/12/20 0725    Specimen:  Blood Updated:  06/12/20 0736     Glucose 201 mg/dL      Comment: : [id number] Uri Cronin  GMeter ID: [id number]       Urine Culture - Urine, Urine, Clean Catch [[id number]] Collected:  06/10/20 2211    Specimen:  Urine, Clean Catch Updated:  06/12/20 0317     Urine Culture 25,000 CFU/mL Mixed Jessi Isolated    Narrative:       Specimen contains mixed organisms of questionable pathogenicity which indicates contamination with commensal jessi.  Further identification is unlikely to provide clinically useful information.  Suggest recollection.        Imaging Results (Last 24 Hours)     ** No results found for the last 24 hours. **          Medication Review:     Current Facility-Administered Medications:   •  acetaminophen (TYLENOL) tablet 650 mg, 650 mg, Oral, Q4H PRN, Miguel Ángel Saul MD, 650 mg at 06/12/20 1559  •  bisacodyl (DULCOLAX) suppository 10 mg, 10 mg, Rectal, Daily, Lalo Carrington PA, 10 mg at 06/12/20 1542  •  cefTRIAXone (ROCEPHIN) 1 g/100 mL 0.9% NS (MBP), 1 g, Intravenous, Q24H, Emilia Bender APRN, 1 g at 06/12/20 1741  •  dextrose (D50W) 25 g/ 50mL Intravenous Solution 25 g, 25 g, Intravenous, Q15 Min PRN, Napoleon, Emilia S, APRN  •  dextrose (GLUTOSE) oral gel 15 g, 15 g, Oral, Q15 Min PRN, Emilia Bender APRN  •  enoxaparin  (LOVENOX) syringe 40 mg, 40 mg, Subcutaneous, Q24H, Emilia Bender APRN, 40 mg at 06/12/20 1741  •  glucagon (human recombinant) (GLUCAGEN DIAGNOSTIC) injection 1 mg, 1 mg, Subcutaneous, Q15 Min PRN, Emilia Bender APRJOSE MARIA  •  HYDROcodone-acetaminophen (NORCO) 5-325 MG per tablet 1 tablet, 1 tablet, Oral, Q4H PRN, Emilia Bender APRN  •  insulin detemir (LEVEMIR) injection 15 Units, 15 Units, Subcutaneous, Nightly, [facility]  •  insulin lispro (humaLOG) injection 0-9 Units, 0-9 Units, Subcutaneous, TID AC, Emilia Bender APRN, 6 Units at 06/12/20 1741  •  magnesium hydroxide (MILK OF MAGNESIA) suspension 2400 mg/10mL 10 mL, 10 mL, Oral, Daily PRN, [facility], PA, 10 mL at 06/12/20 1541  •  ondansetron (ZOFRAN) tablet 4 mg, 4 mg, Oral, Q6H PRN **OR** ondansetron (ZOFRAN) injection 4 mg, 4 mg, Intravenous, Q6H PRN, Emilia Bender APRN  •  sodium chloride 0.9 % flush 10 mL, 10 mL, Intravenous, Q12H, Emilia Bender APRN, 10 mL at 06/12/20 0738  •  sodium chloride 0.9 % flush 10 mL, 10 mL, Intravenous, PRN, Emilia Bender APRN  •  sodium chloride 0.9 % infusion, 75 mL/hr, Intravenous, Continuous, Emilia Bender APRN, Last Rate: 75 mL/hr at 06/12/20 1003, 75 mL/hr at 06/12/20 1003    Assessment/Plan:   Acute pyelonephritis  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  No indication for surgical intervention - no hydronephrosis or stone.  -Check bladder scan to rule out retention; doubt since not distended on CT  -Urine culture with skin contaminants  -Would continue ceftriaxone for now. If fever or WBC gets worse, consider infectious disease consult.       (Please note that portions of this note were completed with a voice recognition program.)  Ishaan Diaz MD  06/12/20  19:04

## 2020-06-13 NOTE — PROGRESS NOTES
Urology  Length of Stay: 3  Patient Care Team:  Provider, No Known as PCP - General (Family Medicine)    Chief Complaint: Follow-up acute pyelonephritis    Subjective     Interval History:   Definite improvement of pain overnight.  Still having some left lower quadrant pain but he rates it as a 2/10.  He says he is voiding with a good stream.  Definite improvement with ceftriaxone.    Review of Systems:   Review of Systems   Constitutional: Negative for diaphoresis and fever.   Gastrointestinal: Positive for abdominal pain (minimal LLQ). Negative for diarrhea and nausea.   Genitourinary: Positive for flank pain (less). Negative for difficulty urinating and frequency.       Objective       Intake/Output Summary (Last 24 hours) at 6/13/2020 1105  Last data filed at 6/13/2020 1047  Gross per 24 hour   Intake 1346 ml   Output 300 ml   Net 1046 ml       Vital Signs  Temp:  [97.5 °F (36.4 °C)-100 °F (37.8 °C)] 99.3 °F (37.4 °C)  Heart Rate:  [74-86] 86  Resp:  [16] 16  BP: (138-143)/(69-74) 138/69    Physical Exam:  Physical Exam  Constitutional:  They  appear well-developed and well-nourished. There are no obvious deformities. No distress. The vital signs are reviewed  Pulmonary/Chest: Effort normal.   GI: Soft. The patient exhibits no distension and no mass. There is minimal left lower quadrant tenderness. There is no rebound and no guarding. No hernia. Still some left flank pain but definitely improved.  Neurological: Patient is alert and oriented to person, place, and time.   Skin: Skin is warm and dry. Not diaphoretic.   Psychiatric:  normal mood and affect. Not agitated.        Results Review:       I reviewed the patient's new clinical results.  Lab Results (last 24 hours)     Procedure Component Value Units Date/Time    CBC & Differential [[id number]] Collected:  06/13/20 0704    Specimen:  Blood Updated:  06/13/20 0817    Narrative:       The following orders were created for panel order CBC &  Differential.  Procedure                               Abnormality         Status                     ---------                               -----------         ------                     CBC Auto Differential[[id number]]        Abnormal            Final result                 Please view results for these tests on the individual orders.    CBC Auto Differential [[id number]]  (Abnormal) Collected:  06/13/20 0704    Specimen:  Blood Updated:  06/13/20 0817     WBC 12.25 10*3/mm3      RBC 3.79 10*6/mm3      Hemoglobin 10.9 g/dL      Hematocrit 32.2 %      MCV 85.0 fL      MCH 28.8 pg      MCHC 33.9 g/dL      RDW 12.5 %      RDW-SD 38.6 fl      MPV 11.8 fL      Platelets 196 10*3/mm3      Neutrophil % 85.0 %      Lymphocyte % 7.1 %      Monocyte % 5.7 %      Eosinophil % 1.2 %      Basophil % 0.2 %      Immature Grans % 0.8 %      Neutrophils, Absolute 10.40 10*3/mm3      Lymphocytes, Absolute 0.87 10*3/mm3      Monocytes, Absolute 0.70 10*3/mm3      Eosinophils, Absolute 0.15 10*3/mm3      Basophils, Absolute 0.03 10*3/mm3      Immature Grans, Absolute 0.10 10*3/mm3      nRBC 0.0 /100 WBC     POC Glucose Once [[id number]]  (Abnormal) Collected:  06/13/20 0804    Specimen:  Blood Updated:  06/13/20 0815     Glucose 245 mg/dL      Comment: : [id number] Alvino PamelaMeter ID: [id number]       POC Glucose Once [[id number]]  (Abnormal) Collected:  06/12/20 1958    Specimen:  Blood Updated:  06/12/20 2019     Glucose 298 mg/dL      Comment: : [id number] Janee AshleyMeter ID: [id number]       Blood Culture - Blood, Arm, Right [[id number]] Collected:  06/10/20 1748    Specimen:  Blood from Arm, Right Updated:  06/12/20 1801     Blood Culture No growth at 2 days    Blood Culture - Blood, Arm, Left [[id number]] Collected:  06/10/20 1748    Specimen:  Blood from Arm, Left Updated:  06/12/20 1801     Blood Culture No growth at 2 days    POC Glucose Once [[id number]]  (Abnormal) Collected:  06/12/20 1639    Specimen:  Blood  Updated:  06/12/20 1649     Glucose 282 mg/dL      Comment: : [id number] Uri Cronin  GMeter ID: [id number]       POC Glucose Once [[id number]]  (Abnormal) Collected:  06/12/20 1159    Specimen:  Blood Updated:  06/12/20 1211     Glucose 256 mg/dL      Comment: : [id number] Uri Cronin  GMeter ID: [id number]       Comprehensive Metabolic Panel [[id number]]  (Abnormal) Collected:  06/12/20 1044    Specimen:  Blood Updated:  06/12/20 1107     Glucose 327 mg/dL      BUN 12 mg/dL      Creatinine 0.86 mg/dL      Sodium 138 mmol/L      Potassium 3.9 mmol/L      Chloride 100 mmol/L      CO2 26.0 mmol/L      Calcium 8.7 mg/dL      Total Protein 6.8 g/dL      Albumin 3.20 g/dL      ALT (SGPT) 46 U/L      AST (SGOT) 45 U/L      Alkaline Phosphatase 183 U/L      Total Bilirubin 0.4 mg/dL      eGFR Non African Amer 90 mL/min/1.73      Globulin 3.6 gm/dL      A/G Ratio 0.9 g/dL      BUN/Creatinine Ratio 14.0     Anion Gap 12.0 mmol/L     Narrative:       GFR Normal >60  Chronic Kidney Disease <60  Kidney Failure <15          Imaging Results (Last 24 Hours)     ** No results found for the last 24 hours. **          Medication Review:     Current Facility-Administered Medications:   •  acetaminophen (TYLENOL) tablet 650 mg, 650 mg, Oral, Q4H PRN, Miguel Ángel Saul MD, 650 mg at 06/12/20 1559  •  bisacodyl (DULCOLAX) suppository 10 mg, 10 mg, Rectal, Daily, [facility] PA, 10 mg at 06/12/20 1542  •  cefTRIAXone (ROCEPHIN) 1 g/100 mL 0.9% NS (MBP), 1 g, Intravenous, Q24H, Emilia Bender APRN, 1 g at 06/12/20 1741  •  dextrose (D50W) 25 g/ 50mL Intravenous Solution 25 g, 25 g, Intravenous, Q15 Min PRN, Emilia Bender APRN  •  dextrose (D50W) 25 g/ 50mL Intravenous Solution 25 g, 25 g, Intravenous, Q15 Min PRN, Miguel Ángel Medina MD  •  dextrose (GLUTOSE) oral gel 15 g, 15 g, Oral, Q15 Min PRN, Emilia Bender APRN  •  dextrose (GLUTOSE) oral gel 15 g, 15 g, Oral, Q15 Min PRN, Miguel Ángel Medina  MD Mello  •  enoxaparin (LOVENOX) syringe 40 mg, 40 mg, Subcutaneous, Q24H, Emilia Bender APRN, 40 mg at 06/12/20 1741  •  glucagon (human recombinant) (GLUCAGEN DIAGNOSTIC) injection 1 mg, 1 mg, Subcutaneous, Q15 Min PRN, Emilia Bender APRN  •  glucagon (human recombinant) (GLUCAGEN DIAGNOSTIC) injection 1 mg, 1 mg, Subcutaneous, Q15 Min PRN, Miguel Ángel Medina MD  •  HYDROcodone-acetaminophen (NORCO) 5-325 MG per tablet 1 tablet, 1 tablet, Oral, Q4H PRN, Emilia Bender APRN  •  insulin detemir (LEVEMIR) injection 25 Units, 25 Units, Subcutaneous, Nightly, Miguel Ángel Medina MD  •  insulin lispro (humaLOG) injection 0-9 Units, 0-9 Units, Subcutaneous, TID AC, Miguel Ángel Medina MD, 4 Units at 06/13/20 0823  •  losartan (COZAAR) 50 mg, hydroCHLOROthiazide (HYDRODIURIL) 12.5 mg for HYZAAR 50-12.5, , Oral, Daily, Miguel Ángel Medina MD  •  magnesium hydroxide (MILK OF MAGNESIA) suspension 2400 mg/10mL 10 mL, 10 mL, Oral, Daily PRN, Lalo Carrington PA, 10 mL at 06/12/20 1541  •  metFORMIN (GLUCOPHAGE) tablet 500 mg, 500 mg, Oral, BID With Meals, Miguel Ángel Medina MD, 500 mg at 06/13/20 0823  •  ondansetron (ZOFRAN) tablet 4 mg, 4 mg, Oral, Q6H PRN **OR** ondansetron (ZOFRAN) injection 4 mg, 4 mg, Intravenous, Q6H PRN, Emilia Bender APRN  •  sodium chloride 0.9 % flush 10 mL, 10 mL, Intravenous, Q12H, Emilia Bender APRN, 10 mL at 06/12/20 0738  •  sodium chloride 0.9 % flush 10 mL, 10 mL, Intravenous, PRN, Napoleon, Emilia S, APRN  •  sodium chloride 0.9 % infusion, 75 mL/hr, Intravenous, Continuous, Emilia Bender, APRN, Last Rate: 75 mL/hr at 06/12/20 2317, 75 mL/hr at 06/12/20 2317    Assessment/Plan:   Acute pyelonephritis  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  Showing some response to ceftriaxone.  Doubt cultures are going to provide us with a definitive microorganism with susceptibility pattern.   Since he is responding to ceftriaxone, I think we could probably change him to cefdinir with discharge on Monday, 6/15/2020.  -I will evaluate him in the office later with cystoscopy after the infection resolves.  My recommendation would be a 10-day course of cefdinir upon discharge Monday, 6/15/2020.  -He seems to be voiding well which I will reassess in the office.  He may benefit from some tamsulosin as I still think this was a lower urinary tract infection that extended to pyelonephritis.  That would be most common with an E. coli.    (Please note that portions of this note were completed with a voice recognition program.)  Ishaan Diaz MD  06/13/20  11:05

## 2020-06-13 NOTE — PLAN OF CARE
Problem: Patient Care Overview  Goal: Plan of Care Review  Outcome: Ongoing (interventions implemented as appropriate)  Flowsheets (Taken 6/13/2020 0241)  Progress: improving  Plan of Care Reviewed With: patient  Outcome Summary: Afebrile tonight.  Post void bladder scan 0ml.  Voiding without difficulty.  C/O some LLQ tenderness, refuses medication.  Monitor.

## 2020-06-14 LAB
ALBUMIN SERPL-MCNC: 2.6 G/DL (ref 3.5–5.2)
ALBUMIN/GLOB SERPL: 0.8 G/DL
ALP SERPL-CCNC: 159 U/L (ref 39–117)
ALT SERPL W P-5'-P-CCNC: 37 U/L (ref 1–41)
ANION GAP SERPL CALCULATED.3IONS-SCNC: 12 MMOL/L (ref 5–15)
AST SERPL-CCNC: 16 U/L (ref 1–40)
BASOPHILS # BLD AUTO: 0.04 10*3/MM3 (ref 0–0.2)
BASOPHILS NFR BLD AUTO: 0.4 % (ref 0–1.5)
BILIRUB SERPL-MCNC: 0.3 MG/DL (ref 0.2–1.2)
BUN BLD-MCNC: 9 MG/DL (ref 8–23)
BUN/CREAT SERPL: 12.9 (ref 7–25)
CALCIUM SPEC-SCNC: 8.3 MG/DL (ref 8.6–10.5)
CHLORIDE SERPL-SCNC: 105 MMOL/L (ref 98–107)
CO2 SERPL-SCNC: 24 MMOL/L (ref 22–29)
CREAT BLD-MCNC: 0.7 MG/DL (ref 0.76–1.27)
DEPRECATED RDW RBC AUTO: 38.6 FL (ref 37–54)
EOSINOPHIL # BLD AUTO: 0.14 10*3/MM3 (ref 0–0.4)
EOSINOPHIL NFR BLD AUTO: 1.2 % (ref 0.3–6.2)
ERYTHROCYTE [DISTWIDTH] IN BLOOD BY AUTOMATED COUNT: 12.4 % (ref 12.3–15.4)
GFR SERPL CREATININE-BSD FRML MDRD: 114 ML/MIN/1.73
GLOBULIN UR ELPH-MCNC: 3.3 GM/DL
GLUCOSE BLD-MCNC: 147 MG/DL (ref 65–99)
GLUCOSE BLDC GLUCOMTR-MCNC: 120 MG/DL (ref 70–130)
GLUCOSE BLDC GLUCOMTR-MCNC: 146 MG/DL (ref 70–130)
GLUCOSE BLDC GLUCOMTR-MCNC: 152 MG/DL (ref 70–130)
GLUCOSE BLDC GLUCOMTR-MCNC: 227 MG/DL (ref 70–130)
HCT VFR BLD AUTO: 30.3 % (ref 37.5–51)
HGB BLD-MCNC: 10.3 G/DL (ref 13–17.7)
IMM GRANULOCYTES # BLD AUTO: 0.1 10*3/MM3 (ref 0–0.05)
IMM GRANULOCYTES NFR BLD AUTO: 0.9 % (ref 0–0.5)
LYMPHOCYTES # BLD AUTO: 1.08 10*3/MM3 (ref 0.7–3.1)
LYMPHOCYTES NFR BLD AUTO: 9.6 % (ref 19.6–45.3)
MCH RBC QN AUTO: 28.9 PG (ref 26.6–33)
MCHC RBC AUTO-ENTMCNC: 34 G/DL (ref 31.5–35.7)
MCV RBC AUTO: 84.9 FL (ref 79–97)
MONOCYTES # BLD AUTO: 0.66 10*3/MM3 (ref 0.1–0.9)
MONOCYTES NFR BLD AUTO: 5.8 % (ref 5–12)
NEUTROPHILS # BLD AUTO: 9.27 10*3/MM3 (ref 1.7–7)
NEUTROPHILS NFR BLD AUTO: 82.1 % (ref 42.7–76)
NRBC BLD AUTO-RTO: 0 /100 WBC (ref 0–0.2)
PLATELET # BLD AUTO: 188 10*3/MM3 (ref 140–450)
PMV BLD AUTO: 11.2 FL (ref 6–12)
POTASSIUM BLD-SCNC: 3.8 MMOL/L (ref 3.5–5.2)
PROT SERPL-MCNC: 5.9 G/DL (ref 6–8.5)
RBC # BLD AUTO: 3.57 10*6/MM3 (ref 4.14–5.8)
SODIUM BLD-SCNC: 141 MMOL/L (ref 136–145)
WBC NRBC COR # BLD: 11.29 10*3/MM3 (ref 3.4–10.8)

## 2020-06-14 PROCEDURE — 25010000002 ENOXAPARIN PER 10 MG: Performed by: NURSE PRACTITIONER

## 2020-06-14 PROCEDURE — 82962 GLUCOSE BLOOD TEST: CPT

## 2020-06-14 PROCEDURE — 85025 COMPLETE CBC W/AUTO DIFF WBC: CPT | Performed by: FAMILY MEDICINE

## 2020-06-14 PROCEDURE — 63710000001 INSULIN LISPRO (HUMAN) PER 5 UNITS: Performed by: FAMILY MEDICINE

## 2020-06-14 PROCEDURE — 80053 COMPREHEN METABOLIC PANEL: CPT | Performed by: FAMILY MEDICINE

## 2020-06-14 PROCEDURE — 63710000001 INSULIN DETEMIR PER 5 UNITS: Performed by: FAMILY MEDICINE

## 2020-06-14 PROCEDURE — 99232 SBSQ HOSP IP/OBS MODERATE 35: CPT | Performed by: UROLOGY

## 2020-06-14 RX ORDER — CETIRIZINE HYDROCHLORIDE 10 MG/1
10 TABLET ORAL DAILY
Status: DISCONTINUED | OUTPATIENT
Start: 2020-06-14 | End: 2020-06-15 | Stop reason: HOSPADM

## 2020-06-14 RX ORDER — CEFDINIR 300 MG/1
300 CAPSULE ORAL EVERY 12 HOURS SCHEDULED
Status: DISCONTINUED | OUTPATIENT
Start: 2020-06-14 | End: 2020-06-15 | Stop reason: HOSPADM

## 2020-06-14 RX ORDER — GLIPIZIDE 5 MG/1
5 TABLET ORAL
Status: DISCONTINUED | OUTPATIENT
Start: 2020-06-14 | End: 2020-06-15 | Stop reason: HOSPADM

## 2020-06-14 RX ADMIN — SODIUM CHLORIDE 75 ML/HR: 9 INJECTION, SOLUTION INTRAVENOUS at 04:40

## 2020-06-14 RX ADMIN — GLIPIZIDE 5 MG: 5 TABLET ORAL at 08:31

## 2020-06-14 RX ADMIN — INSULIN DETEMIR 25 UNITS: 100 INJECTION, SOLUTION SUBCUTANEOUS at 21:15

## 2020-06-14 RX ADMIN — SODIUM CHLORIDE, PRESERVATIVE FREE 10 ML: 5 INJECTION INTRAVENOUS at 08:31

## 2020-06-14 RX ADMIN — ENOXAPARIN SODIUM 40 MG: 40 INJECTION SUBCUTANEOUS at 17:05

## 2020-06-14 RX ADMIN — CEFDINIR 300 MG: 300 CAPSULE ORAL at 08:31

## 2020-06-14 RX ADMIN — INSULIN LISPRO 2 UNITS: 100 INJECTION, SOLUTION INTRAVENOUS; SUBCUTANEOUS at 08:31

## 2020-06-14 RX ADMIN — SODIUM CHLORIDE, PRESERVATIVE FREE 10 ML: 5 INJECTION INTRAVENOUS at 21:16

## 2020-06-14 RX ADMIN — CEFDINIR 300 MG: 300 CAPSULE ORAL at 21:16

## 2020-06-14 RX ADMIN — METFORMIN HYDROCHLORIDE 500 MG: 500 TABLET ORAL at 17:05

## 2020-06-14 RX ADMIN — METFORMIN HYDROCHLORIDE 500 MG: 500 TABLET ORAL at 08:31

## 2020-06-14 RX ADMIN — LOSARTAN POTASSIUM: 50 TABLET, FILM COATED ORAL at 08:31

## 2020-06-14 RX ADMIN — CETIRIZINE HYDROCHLORIDE 10 MG: 10 TABLET, FILM COATED ORAL at 08:31

## 2020-06-14 NOTE — PROGRESS NOTES
Urology  Length of Stay: 4  Patient Care Team:  Provider, No Known as PCP - General (Family Medicine)    Chief Complaint: Follow-up acute pyelonephritis    Subjective     Interval History:   Patient continues to improve with IV ceftriaxone.  He has been changed to cefdinir today.  Continues to have significantly less left flank discomfort.  Denies any dysuria or difficulty urinating.    Review of Systems:   Review of Systems   Constitutional: Negative for chills and fever.   Gastrointestinal: Negative for abdominal pain, anal bleeding and blood in stool.   Genitourinary: Negative for dysuria and hematuria.       Objective       Intake/Output Summary (Last 24 hours) at 6/14/2020 1000  Last data filed at 6/14/2020 0834  Gross per 24 hour   Intake 2027.5 ml   Output 600 ml   Net 1427.5 ml       Vital Signs  Temp:  [98.3 °F (36.8 °C)-99.8 °F (37.7 °C)] 99.8 °F (37.7 °C)  Heart Rate:  [64-84] 69  Resp:  [16-18] 18  BP: (125-138)/(68-83) 125/68    Physical Exam:  Physical Exam  Constitutional:   Temp:  [98.3 °F (36.8 °C)-99.8 °F (37.7 °C)] 99.8 °F (37.7 °C)  Heart Rate:  [64-84] 69  Resp:  [16-18] 18  BP: (125-138)/(68-83) 125/68  ] Obese, no distress  Respiratory:   Effort unlabored; Movements symmetric  GI:   No mass or hernia noted, not distended or tender   No enlargement of spleen or liver noted  Skin:   No pallor or cyanosis; No obvious rash  Psych:   Alert, Oriented x 3        Results Review:       I reviewed the patient's new clinical results.  Lab Results (last 24 hours)     Procedure Component Value Units Date/Time    POC Glucose Once [[id number]]  (Abnormal) Collected:  06/14/20 0749    Specimen:  Blood Updated:  06/14/20 0805     Glucose 152 mg/dL      Comment: : [id number] De Oliveira PamelaMeter ID: [id number]       Comprehensive Metabolic Panel [[id number]]  (Abnormal) Collected:  06/14/20 0511    Specimen:  Blood Updated:  06/14/20 0616     Glucose 147 mg/dL      BUN 9 mg/dL      Creatinine 0.70 mg/dL       Sodium 141 mmol/L      Potassium 3.8 mmol/L      Chloride 105 mmol/L      CO2 24.0 mmol/L      Calcium 8.3 mg/dL      Total Protein 5.9 g/dL      Albumin 2.60 g/dL      ALT (SGPT) 37 U/L      AST (SGOT) 16 U/L      Alkaline Phosphatase 159 U/L      Total Bilirubin 0.3 mg/dL      eGFR Non African Amer 114 mL/min/1.73      Globulin 3.3 gm/dL      A/G Ratio 0.8 g/dL      BUN/Creatinine Ratio 12.9     Anion Gap 12.0 mmol/L     Narrative:       GFR Normal >60  Chronic Kidney Disease <60  Kidney Failure <15      CBC & Differential [[id number]] Collected:  06/14/20 0511    Specimen:  Blood Updated:  06/14/20 0549    Narrative:       The following orders were created for panel order CBC & Differential.  Procedure                               Abnormality         Status                     ---------                               -----------         ------                     CBC Auto Differential[[id number]]        Abnormal            Final result                 Please view results for these tests on the individual orders.    CBC Auto Differential [[id number]]  (Abnormal) Collected:  06/14/20 0511    Specimen:  Blood Updated:  06/14/20 0549     WBC 11.29 10*3/mm3      RBC 3.57 10*6/mm3      Hemoglobin 10.3 g/dL      Hematocrit 30.3 %      MCV 84.9 fL      MCH 28.9 pg      MCHC 34.0 g/dL      RDW 12.4 %      RDW-SD 38.6 fl      MPV 11.2 fL      Platelets 188 10*3/mm3      Neutrophil % 82.1 %      Lymphocyte % 9.6 %      Monocyte % 5.8 %      Eosinophil % 1.2 %      Basophil % 0.4 %      Immature Grans % 0.9 %      Neutrophils, Absolute 9.27 10*3/mm3      Lymphocytes, Absolute 1.08 10*3/mm3      Monocytes, Absolute 0.66 10*3/mm3      Eosinophils, Absolute 0.14 10*3/mm3      Basophils, Absolute 0.04 10*3/mm3      Immature Grans, Absolute 0.10 10*3/mm3      nRBC 0.0 /100 WBC     POC Glucose Once [[id number]]  (Abnormal) Collected:  06/13/20 2133    Specimen:  Blood Updated:  06/13/20 2154     Glucose 201 mg/dL      Comment:  : [id number] Gorge PeraltaMeter ID: [id number]       Blood Culture - Blood, Arm, Right [[id number]] Collected:  06/10/20 1748    Specimen:  Blood from Arm, Right Updated:  06/13/20 1800     Blood Culture No growth at 3 days    Blood Culture - Blood, Arm, Left [[id number]] Collected:  06/10/20 1748    Specimen:  Blood from Arm, Left Updated:  06/13/20 1800     Blood Culture No growth at 3 days    POC Glucose Once [[id number]]  (Abnormal) Collected:  06/13/20 1650    Specimen:  Blood Updated:  06/13/20 1701     Glucose 236 mg/dL      Comment: : [id number] Alvino PamelaMeter ID: [id number]       POC Glucose Once [[id number]]  (Abnormal) Collected:  06/13/20 1205    Specimen:  Blood Updated:  06/13/20 1221     Glucose 247 mg/dL      Comment: : [id number] Alvino PamelaMeter ID: [id number]           Imaging Results (Last 24 Hours)     ** No results found for the last 24 hours. **          Medication Review:     Current Facility-Administered Medications:   •  acetaminophen (TYLENOL) tablet 650 mg, 650 mg, Oral, Q4H PRN, Miguel Ángel Saul MD, 650 mg at 06/13/20 2141  •  bisacodyl (DULCOLAX) suppository 10 mg, 10 mg, Rectal, Daily, Lalo Carrington PA, 10 mg at 06/12/20 1542  •  cefdinir (OMNICEF) capsule 300 mg, 300 mg, Oral, Q12H, Miguel Ángel Medina MD, 300 mg at 06/14/20 0831  •  cetirizine (zyrTEC) tablet 10 mg, 10 mg, Oral, Daily, Miguel Ángel Medina MD, 10 mg at 06/14/20 0831  •  dextrose (D50W) 25 g/ 50mL Intravenous Solution 25 g, 25 g, Intravenous, Q15 Min PRN, Emilia Bender APRN  •  dextrose (D50W) 25 g/ 50mL Intravenous Solution 25 g, 25 g, Intravenous, Q15 Min PRN, Miguel Ángel Medina MD  •  dextrose (GLUTOSE) oral gel 15 g, 15 g, Oral, Q15 Min PRN, Emilia Bender APRN  •  dextrose (GLUTOSE) oral gel 15 g, 15 g, Oral, Q15 Min PRN, Miguel Ángel Medina MD  •  enoxaparin (LOVENOX) syringe 40 mg, 40 mg, Subcutaneous, Q24H, Emilia Bender APRN, 40 mg at 06/13/20 1730  •   glipizide (GLUCOTROL) tablet 5 mg, 5 mg, Oral, QAM , Miguel Ángel Medina MD, 5 mg at 06/14/20 0831  •  glucagon (human recombinant) (GLUCAGEN DIAGNOSTIC) injection 1 mg, 1 mg, Subcutaneous, Q15 Min PRN, Emilia Bender APRN  •  glucagon (human recombinant) (GLUCAGEN DIAGNOSTIC) injection 1 mg, 1 mg, Subcutaneous, Q15 Min PRN, Miguel Ángel Medina MD  •  HYDROcodone-acetaminophen (NORCO) 5-325 MG per tablet 1 tablet, 1 tablet, Oral, Q4H PRN, Emilia Bender APRN  •  insulin detemir (LEVEMIR) injection 25 Units, 25 Units, Subcutaneous, Nightly, Miguel Ángel Medina MD, 25 Units at 06/13/20 2141  •  insulin lispro (humaLOG) injection 0-9 Units, 0-9 Units, Subcutaneous, TID , Miguel Ángel Medina MD, 2 Units at 06/14/20 0831  •  losartan (COZAAR) 50 mg, hydroCHLOROthiazide (HYDRODIURIL) 12.5 mg for HYZAAR 50-12.5, , Oral, Daily, Miguel Ángel Medina MD  •  magnesium hydroxide (MILK OF MAGNESIA) suspension 2400 mg/10mL 10 mL, 10 mL, Oral, Daily PRN, Lalo Carrington PA, 10 mL at 06/12/20 1541  •  metFORMIN (GLUCOPHAGE) tablet 500 mg, 500 mg, Oral, BID With Meals, Miguel Ángel Medina MD, 500 mg at 06/14/20 0831  •  ondansetron (ZOFRAN) tablet 4 mg, 4 mg, Oral, Q6H PRN **OR** ondansetron (ZOFRAN) injection 4 mg, 4 mg, Intravenous, Q6H PRN, Emilia Bender APRN  •  sodium chloride 0.9 % flush 10 mL, 10 mL, Intravenous, Q12H, Emilia Bender APRN, 10 mL at 06/14/20 0831  •  sodium chloride 0.9 % flush 10 mL, 10 mL, Intravenous, PRN, Emilia Bender APRN    Assessment/Plan:   Acute pyelonephritis  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 Cystoscopy as the definitive lower urinary tract study is discussed . The risks of pain and discomfort, infection, and urethral stricture are discussed with the patient including the technique used in the office setting.  All patient questions were  answered.  He may also benefit from some type of urodynamic study, particularly uroflow.  - My recommendation would be a 10-day course of cefdinir upon discharge Monday, 6/15/2020.    Thank you Dr. Saul for the opportunity to participate in his care.  Do not hesitate to contact me with any questions.  We will not see tomorrow unless he develops recurring issues.     (Please note that portions of this note were completed with a voice recognition program.)  Ishaan Diaz MD  06/14/20  10:00

## 2020-06-14 NOTE — PROGRESS NOTES
Ed Cifuentes Jr. is a 64 y.o. male patient.      Patient feels much better today.  Less flank pain.  Urine clearing up.  Voiding on his own.          Current Facility-Administered Medications   Medication Dose Route Frequency Provider Last Rate Last Dose   • acetaminophen (TYLENOL) tablet 650 mg  650 mg Oral Q4H PRN Miguel Ángel Saul MD   650 mg at 06/13/20 2141   • bisacodyl (DULCOLAX) suppository 10 mg  10 mg Rectal Daily Lalo Carrington PA   10 mg at 06/12/20 1542   • cefTRIAXone (ROCEPHIN) 1 g/100 mL 0.9% NS (MBP)  1 g Intravenous Q24H Emilia Bender APRN   1 g at 06/13/20 1730   • dextrose (D50W) 25 g/ 50mL Intravenous Solution 25 g  25 g Intravenous Q15 Min PRN Emilia Bender APRN       • dextrose (D50W) 25 g/ 50mL Intravenous Solution 25 g  25 g Intravenous Q15 Min PRN Miguel Ángel Medina MD       • dextrose (GLUTOSE) oral gel 15 g  15 g Oral Q15 Min PRN Emilia Bender APRN       • dextrose (GLUTOSE) oral gel 15 g  15 g Oral Q15 Min PRN Miguel Ángel Medina MD       • enoxaparin (LOVENOX) syringe 40 mg  40 mg Subcutaneous Q24H Emilia Bender APRN   40 mg at 06/13/20 1730   • glucagon (human recombinant) (GLUCAGEN DIAGNOSTIC) injection 1 mg  1 mg Subcutaneous Q15 Min PRN Emilia Bender APRN       • glucagon (human recombinant) (GLUCAGEN DIAGNOSTIC) injection 1 mg  1 mg Subcutaneous Q15 Min PRN Miguel Ángel Medina MD       • HYDROcodone-acetaminophen (NORCO) 5-325 MG per tablet 1 tablet  1 tablet Oral Q4H PRN Emilia Bender APRN       • insulin detemir (LEVEMIR) injection 25 Units  25 Units Subcutaneous Nightly Miguel Ángel Medina MD   25 Units at 06/13/20 2141   • insulin lispro (humaLOG) injection 0-9 Units  0-9 Units Subcutaneous TID AC Miguel Ángel Medina MD   4 Units at 06/13/20 1730   • losartan (COZAAR) 50 mg, hydroCHLOROthiazide (HYDRODIURIL) 12.5 mg for HYZAAR 50-12.5   Oral Daily Miguel Ángel Medina MD       • magnesium hydroxide (MILK OF  "MAGNESIA) suspension 2400 mg/10mL 10 mL  10 mL Oral Daily PRN Lalo Carrington PA   10 mL at 06/12/20 1541   • metFORMIN (GLUCOPHAGE) tablet 500 mg  500 mg Oral BID With Meals Miguel Ángel Medina MD   500 mg at 06/13/20 1730   • ondansetron (ZOFRAN) tablet 4 mg  4 mg Oral Q6H PRN Emilia Bender APRN        Or   • ondansetron (ZOFRAN) injection 4 mg  4 mg Intravenous Q6H PRN Emilia Bender APRJOSE MARIA       • sodium chloride 0.9 % flush 10 mL  10 mL Intravenous Q12H Emilia Bender APRJOSE MARIA   10 mL at 06/12/20 0738   • sodium chloride 0.9 % flush 10 mL  10 mL Intravenous PRN Emilia Bender APRN       • sodium chloride 0.9 % infusion  75 mL/hr Intravenous Continuous Emilia Bender APRN 75 mL/hr at 06/14/20 0440 75 mL/hr at 06/14/20 0440     ALLERGIES:  No Known Allergies    Acute pyelonephritis    Type 2 diabetes mellitus with hyperglycemia (CMS/[facility])    Pyelonephritis    Blood pressure 137/77, pulse 84, temperature 98.8 °F (37.1 °C), temperature source Oral, resp. rate 18, height 175.3 cm (69\"), weight 130 kg (286 lb 9.6 oz), SpO2 96 %.      Subjective:  Symptoms:  Stable.    Diet:  Adequate intake.    Activity level: Returning to normal.    Pain:  He complains of pain that is mild.  He reports pain is improving.  Pain is well controlled.      Review of Systems  Objective:  General Appearance:  Comfortable and well-appearing.    Vital signs: (most recent): Blood pressure 137/77, pulse 84, temperature 98.8 °F (37.1 °C), temperature source Oral, resp. rate 18, height 175.3 cm (69\"), weight 130 kg (286 lb 9.6 oz), SpO2 96 %.  Vital signs are normal.    Output: Producing urine and minimal stool output.    HEENT: Normal HEENT exam.    Lungs:  Normal effort and normal respiratory rate.    Heart: Normal rate.  Regular rhythm.    Abdomen: Abdomen is soft.  (Obese).  There is generalized tenderness.     Extremities: Normal range of motion.    Skin:  Warm and dry.              Labs:  Lab Results (last 72 " hours)     Procedure Component Value Units Date/Time    POC Glucose Once [[id number]]  (Abnormal) Collected:  06/12/20 1958    Specimen:  Blood Updated:  06/12/20 2019     Glucose 298 mg/dL      Comment: : [id number] Janee Li ID: [id number]       Blood Culture - Blood, Arm, Right [[id number]] Collected:  06/10/20 1748    Specimen:  Blood from Arm, Right Updated:  06/12/20 1801     Blood Culture No growth at 2 days    Blood Culture - Blood, Arm, Left [[id number]] Collected:  06/10/20 1748    Specimen:  Blood from Arm, Left Updated:  06/12/20 1801     Blood Culture No growth at 2 days    POC Glucose Once [[id number]]  (Abnormal) Collected:  06/12/20 1639    Specimen:  Blood Updated:  06/12/20 1649     Glucose 282 mg/dL      Comment: : [id number] Uri Cronin  GMeter ID: [id number]       POC Glucose Once [[id number]]  (Abnormal) Collected:  06/12/20 1159    Specimen:  Blood Updated:  06/12/20 1211     Glucose 256 mg/dL      Comment: : [id number] Uri Roseanne  GMeter ID: [id number]       Comprehensive Metabolic Panel [[id number]]  (Abnormal) Collected:  06/12/20 1044    Specimen:  Blood Updated:  06/12/20 1107     Glucose 327 mg/dL      BUN 12 mg/dL      Creatinine 0.86 mg/dL      Sodium 138 mmol/L      Potassium 3.9 mmol/L      Chloride 100 mmol/L      CO2 26.0 mmol/L      Calcium 8.7 mg/dL      Total Protein 6.8 g/dL      Albumin 3.20 g/dL      ALT (SGPT) 46 U/L      AST (SGOT) 45 U/L      Alkaline Phosphatase 183 U/L      Total Bilirubin 0.4 mg/dL      eGFR Non African Amer 90 mL/min/1.73      Globulin 3.6 gm/dL      A/G Ratio 0.9 g/dL      BUN/Creatinine Ratio 14.0     Anion Gap 12.0 mmol/L     Narrative:       GFR Normal >60  Chronic Kidney Disease <60  Kidney Failure <15      C-reactive Protein [[id number]]  (Abnormal) Collected:  06/12/20 1044    Specimen:  Blood Updated:  06/12/20 1104     C-Reactive Protein 27.41 mg/dL     CBC & Differential [[id number]] Collected:  06/12/20 1029    Specimen:   Blood Updated:  06/12/20 1048    Narrative:       The following orders were created for panel order CBC & Differential.  Procedure                               Abnormality         Status                     ---------                               -----------         ------                     CBC Auto Differential[[id number]]        Abnormal            Final result                 Please view results for these tests on the individual orders.    CBC Auto Differential [[id number]]  (Abnormal) Collected:  06/12/20 1029    Specimen:  Blood Updated:  06/12/20 1048     WBC 12.20 10*3/mm3      RBC 4.08 10*6/mm3      Hemoglobin 11.5 g/dL      Hematocrit 34.9 %      MCV 85.5 fL      MCH 28.2 pg      MCHC 33.0 g/dL      RDW 12.7 %      RDW-SD 39.6 fl      MPV 11.1 fL      Platelets 225 10*3/mm3      Neutrophil % 82.8 %      Lymphocyte % 9.0 %      Monocyte % 5.4 %      Eosinophil % 1.5 %      Basophil % 0.4 %      Immature Grans % 0.9 %      Neutrophils, Absolute 10.10 10*3/mm3      Lymphocytes, Absolute 1.10 10*3/mm3      Monocytes, Absolute 0.66 10*3/mm3      Eosinophils, Absolute 0.18 10*3/mm3      Basophils, Absolute 0.05 10*3/mm3      Immature Grans, Absolute 0.11 10*3/mm3      nRBC 0.0 /100 WBC     POC Glucose Once [[id number]]  (Abnormal) Collected:  06/12/20 0725    Specimen:  Blood Updated:  06/12/20 0736     Glucose 201 mg/dL      Comment: : [id number] Uri Cronin  GMeter ID: [id number]       Urine Culture - Urine, Urine, Clean Catch [[id number]] Collected:  06/10/20 2211    Specimen:  Urine, Clean Catch Updated:  06/12/20 0317     Urine Culture 25,000 CFU/mL Mixed Jessi Isolated    Narrative:       Specimen contains mixed organisms of questionable pathogenicity which indicates contamination with commensal jessi.  Further identification is unlikely to provide clinically useful information.  Suggest recollection.    POC Glucose Once [[id number]]  (Abnormal) Collected:  06/11/20 1707    Specimen:  Blood Updated:   06/11/20 1719     Glucose 239 mg/dL      Comment: : [id number] Uri Cronin  GMeter ID: [id number]       POC Glucose Once [[id number]]  (Abnormal) Collected:  06/11/20 1326    Specimen:  Blood Updated:  06/11/20 1404     Glucose 268 mg/dL      Comment: : [id number] Uri Cronin  GMeter ID: [id number]       POC Glucose Once [[id number]]  (Abnormal) Collected:  06/11/20 0804    Specimen:  Blood Updated:  06/11/20 0816     Glucose 242 mg/dL      Comment: : [id number] Uri Cronin  GMeter ID: [id number]       Urinalysis With Culture If Indicated - Urine, Clean Catch [[id number]]  (Abnormal) Collected:  06/10/20 2211    Specimen:  Urine, Clean Catch Updated:  06/10/20 2228     Color, UA Yellow     Appearance, UA Clear     pH, UA <=5.0     Specific Gravity, UA >1.030     Glucose, UA Negative     Ketones, UA Trace     Bilirubin, UA Negative     Blood, UA Trace     Protein, UA Trace     Leuk Esterase, UA Small (1+)     Nitrite, UA Negative     Urobilinogen, UA 0.2 E.U./dL    Urinalysis, Microscopic Only - Urine, Clean Catch [[id number]]  (Abnormal) Collected:  06/10/20 2211    Specimen:  Urine, Clean Catch Updated:  06/10/20 2228     RBC, UA 3-5 /HPF      WBC, UA Too Numerous to Count /HPF      Bacteria, UA None Seen /HPF      Squamous Epithelial Cells, UA 3-6 /HPF      Hyaline Casts, UA 0-2 /LPF      Methodology Automated Microscopy    POC Glucose Once [[id number]]  (Abnormal) Collected:  06/10/20 2008    Specimen:  Blood Updated:  06/10/20 2027     Glucose 243 mg/dL      Comment: : [id number] Janee WilliamsburgMeter ID: [id number]       Urinalysis With Microscopic If Indicated (No Culture) - Urine, Clean Catch [[id number]]  (Abnormal) Collected:  06/10/20 1814    Specimen:  Urine, Clean Catch Updated:  06/10/20 1828     Color, UA Yellow     Appearance, UA Cloudy     pH, UA <=5.0     Specific Gravity, UA 1.018     Glucose,  mg/dL (Trace)     Ketones, UA Negative     Bilirubin, UA Negative     Blood, UA Small (1+)      Protein, UA 30 mg/dL (1+)     Leuk Esterase, UA Moderate (2+)     Nitrite, UA Negative     Urobilinogen, UA 0.2 E.U./dL    Urinalysis, Microscopic Only - Urine, Clean Catch [[id number]]  (Abnormal) Collected:  06/10/20 1814    Specimen:  Urine, Clean Catch Updated:  06/10/20 1828     RBC, UA 6-12 /HPF      WBC, UA Too Numerous to Count /HPF      Bacteria, UA None Seen /HPF      Squamous Epithelial Cells, UA None Seen /HPF      Hyaline Casts, UA 3-6 /LPF      Methodology Automated Microscopy    Comprehensive Metabolic Panel [[id number]]  (Abnormal) Collected:  06/10/20 1748    Specimen:  Blood Updated:  06/10/20 1827     Glucose 279 mg/dL      BUN 27 mg/dL      Creatinine 1.05 mg/dL      Sodium 138 mmol/L      Potassium 3.6 mmol/L      Chloride 97 mmol/L      CO2 27.0 mmol/L      Calcium 9.0 mg/dL      Total Protein 7.1 g/dL      Albumin 3.70 g/dL      ALT (SGPT) 24 U/L      AST (SGOT) 12 U/L      Alkaline Phosphatase 142 U/L      Total Bilirubin 0.6 mg/dL      eGFR Non African Amer 71 mL/min/1.73      Globulin 3.4 gm/dL      A/G Ratio 1.1 g/dL      BUN/Creatinine Ratio 25.7     Anion Gap 14.0 mmol/L     Narrative:       GFR Normal >60  Chronic Kidney Disease <60  Kidney Failure <15      Lactic Acid, Plasma [[id number]]  (Normal) Collected:  06/10/20 1748    Specimen:  Blood Updated:  06/10/20 1816     Lactate 1.3 mmol/L     CBC & Differential [[id number]] Collected:  06/10/20 1748    Specimen:  Blood Updated:  06/10/20 1814    Narrative:       The following orders were created for panel order CBC & Differential.  Procedure                               Abnormality         Status                     ---------                               -----------         ------                     Manual Differential[[id number]]                                                         CBC Auto Differential[[id number]]        Abnormal            Final result                 Please view results for these tests on the individual  orders.    CBC Auto Differential [[id number]]  (Abnormal) Collected:  06/10/20 1748    Specimen:  Blood Updated:  06/10/20 1814     WBC 17.20 10*3/mm3      RBC 4.04 10*6/mm3      Hemoglobin 11.5 g/dL      Hematocrit 34.1 %      MCV 84.4 fL      MCH 28.5 pg      MCHC 33.7 g/dL      RDW 12.9 %      RDW-SD 39.3 fl      MPV 11.1 fL      Platelets 241 10*3/mm3      Neutrophil % 84.4 %      Lymphocyte % 9.2 %      Monocyte % 5.1 %      Eosinophil % 0.1 %      Basophil % 0.4 %      Neutrophils, Absolute 14.51 10*3/mm3      Lymphocytes, Absolute 1.58 10*3/mm3      Monocytes, Absolute 0.88 10*3/mm3      Eosinophils, Absolute 0.02 10*3/mm3      Basophils, Absolute 0.07 10*3/mm3     Hemoglobin A1c [[id number]]  (Abnormal) Collected:  06/10/20 1748    Specimen:  Blood Updated:  06/10/20 1813     Hemoglobin A1C 10.20 %     Narrative:       Hemoglobin A1C Ranges:    Increased Risk for Diabetes  5.7% to 6.4%  Diabetes                     >= 6.5%  Diabetic Goal                < 7.0%          Imaging Results (Last 72 Hours)     ** No results found for the last 72 hours. **                Assessment:    Condition: In stable condition.  Improving.   (    Type 2 diabetes- will start Glucophage with body habitus, add ARB inhibitor as well.  Patient will need statin therapy on an outpatient basis.  Restart home Amaryl change diet and consult diabetic educator today.  Poor disease insight.    Pyelonephritis/urinary tract infection present on admission- white count trending down, symptomatic improvement.  Urology following.  Will change to oral antibiotics today and discharge home in a.m. change Rocephin to Omnicef for 7 days upon discharge      Right lobe of liver with ill-defined enhancement- ultrasound when discharged, CT reviewed enlarged prostate noted.  Will need urology follow-up outpatient basis.    ).     Patient Active Problem List   Diagnosis   • Acute pyelonephritis   • Type 2 diabetes mellitus with hyperglycemia (CMS/HCC)   •  Pyelonephritis     Miguel Ángel Meidna MD  6/14/2020

## 2020-06-14 NOTE — PLAN OF CARE
Problem: Patient Care Overview  Goal: Plan of Care Review  Flowsheets (Taken 6/14/2020 0116)  Progress: improving  Plan of Care Reviewed With: patient  Note:   Ivf/ iv abx cont. Voiding without difficulty. Tolerating diet. Hs blood sugar 201. Tylenol po x1 for c/o ha. Up ad eligio. No distress noted. Cont to monitor.

## 2020-06-14 NOTE — PLAN OF CARE
VSS, up ad eligio, voiding, urine clear yellow, safety maintained, ambulating, IID with PO antibiotics started today, tolerating ADA diet, accuchecks and sliding scale as ordered, levels improved over yesterday, will continue to monitor, likely discharge home tomorrow    Problem: Patient Care Overview  Goal: Plan of Care Review  Outcome: Ongoing (interventions implemented as appropriate)  Flowsheets  Taken 6/14/2020 1525  Progress: improving  Taken 6/14/2020 1017  Plan of Care Reviewed With: patient

## 2020-06-15 VITALS
BODY MASS INDEX: 42.45 KG/M2 | DIASTOLIC BLOOD PRESSURE: 78 MMHG | WEIGHT: 286.6 LBS | TEMPERATURE: 99.1 F | HEIGHT: 69 IN | SYSTOLIC BLOOD PRESSURE: 146 MMHG | HEART RATE: 70 BPM | OXYGEN SATURATION: 93 % | RESPIRATION RATE: 18 BRPM

## 2020-06-15 LAB
BACTERIA SPEC AEROBE CULT: NORMAL
BACTERIA SPEC AEROBE CULT: NORMAL
GLUCOSE BLDC GLUCOMTR-MCNC: 184 MG/DL (ref 70–130)

## 2020-06-15 PROCEDURE — 82962 GLUCOSE BLOOD TEST: CPT

## 2020-06-15 PROCEDURE — 63710000001 INSULIN LISPRO (HUMAN) PER 5 UNITS: Performed by: FAMILY MEDICINE

## 2020-06-15 RX ORDER — CEFDINIR 300 MG/1
300 CAPSULE ORAL EVERY 12 HOURS SCHEDULED
Qty: 20 CAPSULE | Refills: 0 | Status: SHIPPED | OUTPATIENT
Start: 2020-06-15 | End: 2020-06-25

## 2020-06-15 RX ADMIN — LOSARTAN POTASSIUM: 50 TABLET, FILM COATED ORAL at 08:09

## 2020-06-15 RX ADMIN — CETIRIZINE HYDROCHLORIDE 10 MG: 10 TABLET, FILM COATED ORAL at 08:08

## 2020-06-15 RX ADMIN — CEFDINIR 300 MG: 300 CAPSULE ORAL at 08:08

## 2020-06-15 RX ADMIN — INSULIN LISPRO 2 UNITS: 100 INJECTION, SOLUTION INTRAVENOUS; SUBCUTANEOUS at 08:08

## 2020-06-15 RX ADMIN — BISACODYL 10 MG: 10 SUPPOSITORY RECTAL at 09:36

## 2020-06-15 RX ADMIN — GLIPIZIDE 5 MG: 5 TABLET ORAL at 08:09

## 2020-06-15 RX ADMIN — METFORMIN HYDROCHLORIDE 500 MG: 500 TABLET ORAL at 08:08

## 2020-06-15 NOTE — PLAN OF CARE
Problem: Patient Care Overview  Goal: Plan of Care Review  Flowsheets (Taken 6/15/2020 0201)  Progress: improving  Plan of Care Reviewed With: patient  Note:   IID. Po abx cont. Tolerating diet. No c/o pain. Voiding without difficulty. Hs blood sugar 227. No distress noted. Cont to monitor.

## 2020-06-15 NOTE — DISCHARGE SUMMARY
Hospital Discharge Summary    Ed Cifuentes Jr.  :  1955  MRN:  [id number]    Admit date:  6/10/2020  Discharge date:   6/15/2020      Admitting Physician:    Miguel Ángel Saul MD    Discharge Diagnoses:      Acute pyelonephritis    Type 2 diabetes mellitus with hyperglycemia (CMS/HCC)    Pyelonephritis  Uncontrolled DM II with hga1c >10  BPH  Liver nodularity needing outpt followup    Hospital Course:   The patient was admitted for the above noted medical/surgical issues.  The patient is a employee of [employer] largely admitted from our [facility] clinic with fever chills and found to have evidence of pyelonephritis on the CT scan.  Is directly admitted placed on IV Rocephin seen in consultation by urology he did have BPH on his CT scan.  His type 2 diabetes is noted to be dramatically out of control with a hemoglobin A1c in excess of 10.  His urine culture did not show a definitive organism or ID and sensitivity.  Blood cultures x2 were negative.  He very slowly responded defervesced and stabilized from a pain perspective.  He did have nodular prostate enlargement.  He did have ill-defined enhancement of the right lobe of his liver posteriorly and medial most likely representing hemangioma but does need outpatient ultrasound for further evaluation.  His pyelonephritis is noted to be on the left.  He will follow-up with Dr. Diaz for cystoscopy.  Please see daily progress note for further details concerning their stay. The patient improved throughout their stay and reached maximum medical improvement on the day of discharge. The patient/family agree with the treatment plan as outlined above. All questions concerning their stay were answered prior to discharge. They understand the importance of follow up concerning any abnormal test results.       Discharge Medications:         Discharge Medications      New Medications      Instructions Start Date   cefdinir 300 MG capsule  Commonly known as:   OMNICEF   300 mg, Oral, Every 12 Hours Scheduled      insulin detemir 100 UNIT/ML injection  Commonly known as:  LEVEMIR   25 Units, Subcutaneous, Nightly      metFORMIN 500 MG tablet  Commonly known as:  GLUCOPHAGE   500 mg, Oral, 2 Times Daily With Meals         Continue These Medications      Instructions Start Date   desloratadine 5 MG tablet  Commonly known as:  CLARINEX   5 mg, Oral, Daily      glimepiride 2 MG tablet  Commonly known as:  AMARYL   2 mg, Oral, Every Morning Before Breakfast      losartan-hydrochlorothiazide 50-12.5 MG per tablet  Commonly known as:  HYZAAR   1 tablet, Oral, Daily         ASK your doctor about these medications      Instructions Start Date   furosemide 40 MG tablet  Commonly known as:  LASIX   40 mg, Oral, Daily             Consults:   Dr. Ishaan Diaz  Significant Diagnostic Studies:      EKG: normal EKG, normal sinus rhythm, unchanged from previous tracings.  CT abdomen pelvis    Treatments:   IV Rocephin    Disposition:   Home  Follow up with Dr. Saul in 1 weeks.    Signed:  Miguel Ángel Saul MD MPH  6/15/2020, 08:35

## 2020-06-16 ENCOUNTER — READMISSION MANAGEMENT (OUTPATIENT)
Dept: CALL CENTER | Facility: HOSPITAL | Age: 65
End: 2020-06-16

## 2020-06-16 NOTE — OUTREACH NOTE
Prep Survey      Responses   [facility] patient discharged from?  [place]   Is LACE score < 7 ?  No   Eligibility  Readm Mgmt   Discharge diagnosis  Acute pyelonephritis, DM II with hyperglycemia, BPH, liver nodularity   COVID-19 Test Status  Not tested   Does the patient have one of the following disease processes/diagnoses(primary or secondary)?  Other   Does the patient have Home health ordered?  No   Is there a DME ordered?  No   Comments regarding appointments  Pt to schedule F/U with Urology   Medication alerts for this patient  see AVS   Prep survey completed?  Yes          Dory Manley RN

## 2020-06-18 ENCOUNTER — READMISSION MANAGEMENT (OUTPATIENT)
Dept: CALL CENTER | Facility: HOSPITAL | Age: 65
End: 2020-06-18

## 2020-06-18 NOTE — OUTREACH NOTE
Medical Week 1 Survey      Responses   [facility] patient discharged from?  [place]   COVID-19 Test Status  Not tested   Does the patient have one of the following disease processes/diagnoses(primary or secondary)?  Other   Is there a successful TCM telephone encounter documented?  No   Week 1 attempt successful?  No   Unsuccessful attempts  Attempt 1          Dory Vasquez RN

## 2020-06-22 ENCOUNTER — READMISSION MANAGEMENT (OUTPATIENT)
Dept: CALL CENTER | Facility: HOSPITAL | Age: 65
End: 2020-06-22

## 2020-06-22 NOTE — OUTREACH NOTE
Medical Week 1 Survey      Responses   [facility] patient discharged from?  [place]   COVID-19 Test Status  Not tested   Does the patient have one of the following disease processes/diagnoses(primary or secondary)?  Other   Is there a successful TCM telephone encounter documented?  No   Week 1 attempt successful?  No   Unsuccessful attempts  Attempt 2          Sandra Oconnor RN

## 2020-06-23 ENCOUNTER — READMISSION MANAGEMENT (OUTPATIENT)
Dept: CALL CENTER | Facility: HOSPITAL | Age: 65
End: 2020-06-23

## 2020-06-23 NOTE — OUTREACH NOTE
Medical Week 1 Survey      Responses   [facility] patient discharged from?  [place]   COVID-19 Test Status  Not tested   Does the patient have one of the following disease processes/diagnoses(primary or secondary)?  Other   Is there a successful TCM telephone encounter documented?  No   Week 1 attempt successful?  No   Unsuccessful attempts  Attempt 3          Princess Ulrich RN

## 2020-06-25 ENCOUNTER — PROCEDURE VISIT (OUTPATIENT)
Dept: UROLOGY | Facility: CLINIC | Age: 65
End: 2020-06-25

## 2020-06-25 DIAGNOSIS — N13.8 BPH WITH OBSTRUCTION/LOWER URINARY TRACT SYMPTOMS: Primary | ICD-10-CM

## 2020-06-25 DIAGNOSIS — N40.1 BPH WITH OBSTRUCTION/LOWER URINARY TRACT SYMPTOMS: Primary | ICD-10-CM

## 2020-06-25 DIAGNOSIS — N10 ACUTE PYELONEPHRITIS: ICD-10-CM

## 2020-06-25 LAB
BILIRUB BLD-MCNC: NEGATIVE MG/DL
CLARITY, POC: CLEAR
COLOR UR: YELLOW
GLUCOSE UR STRIP-MCNC: NEGATIVE MG/DL
KETONES UR QL: NEGATIVE
LEUKOCYTE EST, POC: NEGATIVE
NITRITE UR-MCNC: NEGATIVE MG/ML
PH UR: 5.5 [PH] (ref 5–8)
PROT UR STRIP-MCNC: NEGATIVE MG/DL
RBC # UR STRIP: NEGATIVE /UL
SP GR UR: 1.01 (ref 1–1.03)
UROBILINOGEN UR QL: NORMAL

## 2020-06-25 PROCEDURE — 81003 URINALYSIS AUTO W/O SCOPE: CPT | Performed by: UROLOGY

## 2020-06-25 PROCEDURE — 52000 CYSTOURETHROSCOPY: CPT | Performed by: UROLOGY

## 2020-06-26 DIAGNOSIS — N40.1 BPH WITH OBSTRUCTION/LOWER URINARY TRACT SYMPTOMS: ICD-10-CM

## 2020-06-26 DIAGNOSIS — N13.8 BPH WITH OBSTRUCTION/LOWER URINARY TRACT SYMPTOMS: ICD-10-CM

## 2020-06-26 RX ORDER — TAMSULOSIN HYDROCHLORIDE 0.4 MG/1
1 CAPSULE ORAL NIGHTLY
Qty: 30 CAPSULE | Refills: 11 | Status: SHIPPED | OUTPATIENT
Start: 2020-06-26 | End: 2020-06-26

## 2020-06-26 RX ORDER — TAMSULOSIN HYDROCHLORIDE 0.4 MG/1
1 CAPSULE ORAL NIGHTLY
Qty: 30 CAPSULE | Refills: 11 | Status: SHIPPED | OUTPATIENT
Start: 2020-06-26 | End: 2021-06-21

## 2020-06-26 RX ORDER — TAMSULOSIN HYDROCHLORIDE 0.4 MG/1
1 CAPSULE ORAL NIGHTLY
Qty: 30 CAPSULE | Refills: 11 | Status: SHIPPED | OUTPATIENT
Start: 2020-06-26 | End: 2020-06-26 | Stop reason: SDUPTHER

## 2020-06-26 NOTE — PROGRESS NOTES
CC: I am here for the doctor to look at my bladder    Cystoscopy procedure note  Pre- operative diagnosis:  Recent urinary tract infection    Post operative diagnosis:  Benign prostate hyperplasia with obstruction probably secondary to median lobe as well as lateral lobe enlargement    Procedure:  The patient was prepped and draped in a normal sterile fashion.  The urethra was anesthetized with 2% lidocaine jelly.  A well lubricated flexible cystoscope was introduced per urethra.  The anterior urethra is normal in its caliber without evidence of a mass.  There appears to be no contracture at the bladder neck.  The prostatic urethra reveals Bilateral lobe enlargement, Enlarged median lobe and obstructive appearance.   The bladder shows a normal urothelium without evidence of a mass, erythema, nor diverticulum.  There is no evidence of a bladder stone nor foreign body.  The detrusor is moderately trabeculated.  The ureteral orifces are essentially normal in locationn and there is clear efflux of urine.    Patient tolerated the procedure well    Complications: none    Blood loss: minimal    Diagnoses and all orders for this visit:    BPH with significant median lobe causing obstruction/lower urinary tract symptoms  -     tamsulosin (FLOMAX) 0.4 MG capsule 24 hr capsule; Take 1 capsule by mouth Every Night for 360 days.    Acute pyelonephritis  -     POC Urinalysis Dipstick, Multipro          Assessment/Plan                Follow up:    -We will begin him on tamsulosin.  I will see him back in 3 months.  He did admit to me that his voiding symptoms sound to be a little worse than his explanation at the hospital.  I do think prostatic hyperplasia especially with a median lobe is his main predisposition to urinary tract infection.  I do think he is a patient that will ultimately require TURP but would likely have very good results with him provided he is not developed bladder hypocontractility.  We will check a postvoid  residual on him when he returns in 3 months.  Consideration for urodynamics if his residual is significantly elevated.

## 2020-06-30 ENCOUNTER — READMISSION MANAGEMENT (OUTPATIENT)
Dept: CALL CENTER | Facility: HOSPITAL | Age: 65
End: 2020-06-30

## 2020-06-30 NOTE — OUTREACH NOTE
Medical Week 2 Survey      Responses   [facility] patient discharged from?  [place]   COVID-19 Test Status  Not tested   Does the patient have one of the following disease processes/diagnoses(primary or secondary)?  Other   Week 2 attempt successful?  Yes   Call start time  1208   Discharge diagnosis  Acute pyelonephritis, DM II with hyperglycemia, BPH, liver nodularity   Call end time  1209   Is patient permission given to speak with other caregiver?  No   Meds reviewed with patient/caregiver?  Yes   Is the patient having any side effects they believe may be caused by any medication additions or changes?  No   Does the patient have all medications ordered at discharge?  Yes   Is the patient taking all medications as directed (includes completed medication regime)?  Yes   Does the patient have a primary care provider?   Yes   Does the patient have an appointment with their PCP within 7 days of discharge?  Yes   Has the patient kept scheduled appointments due by today?  Yes   Pulse Ox monitoring  None   Psychosocial issues?  No   Did the patient receive a copy of their discharge instructions?  Yes   Nursing interventions  Reviewed instructions with patient, Educated on MyChart   What is the patient's perception of their health status since discharge?  Improving   Is the patient/caregiver able to teach back signs and symptoms related to disease process for when to call PCP?  Yes   Is the patient/caregiver able to teach back signs and symptoms related to disease process for when to call 911?  Yes   Is the patient/caregiver able to teach back the hierarchy of who to call/visit for symptoms/problems? PCP, Specialist, Home health nurse, Urgent Care, ED, 911  Yes   Week 2 Call Completed?  Yes          Princess Ulrich RN

## 2020-07-01 ENCOUNTER — TELEPHONE (OUTPATIENT)
Dept: UROLOGY | Facility: CLINIC | Age: 65
End: 2020-07-01

## 2020-07-01 NOTE — TELEPHONE ENCOUNTER
----- Message from Virginia Lyons sent at 7/1/2020 11:29 AM CDT -----  I faxed it again if it doesn't work they will have to give me a different number.     ----- Message -----  From: Marily Elias  Sent: 7/1/2020   8:40 AM CDT  To: [facility] said they didn't receive fax. I seen you scanned it in so I wanted to make sure it went through on your end. If you don't mind to send this again please. Thanks.

## 2020-07-06 ENCOUNTER — READMISSION MANAGEMENT (OUTPATIENT)
Dept: CALL CENTER | Facility: HOSPITAL | Age: 65
End: 2020-07-06

## 2020-07-06 ENCOUNTER — TELEPHONE (OUTPATIENT)
Dept: UROLOGY | Facility: CLINIC | Age: 65
End: 2020-07-06

## 2020-07-06 NOTE — OUTREACH NOTE
"Medical Week 3 Survey      Responses   [facility] patient discharged from?  [place]   COVID-19 Test Status  Not tested   Does the patient have one of the following disease processes/diagnoses(primary or secondary)?  Other   Week 3 attempt successful?  Yes   Call start time  1638   Call end time  1645   Meds reviewed with patient/caregiver?  Yes   Is the patient having any side effects they believe may be caused by any medication additions or changes?  No   Does the patient have all medications ordered at discharge?  Yes   Is the patient taking all medications as directed (includes completed medication regime)?  Yes   Does the patient have a primary care provider?   Yes   Does the patient have an appointment with their PCP within 7 days of discharge?  Yes   Has the patient kept scheduled appointments due by today?  Yes   Comments  States did not follow up with Dr Diaz-has called Dr Diaz office today, and waiting for return call.   Has home health visited the patient within 72 hours of discharge?  N/A   Pulse Ox monitoring  None   Psychosocial issues?  No   Did the patient receive a copy of their discharge instructions?  Yes   What is the patient's perception of their health status since discharge?  Worsening   Is the patient/caregiver able to teach back signs and symptoms related to disease process for when to call PCP?  Yes   Is the patient/caregiver able to teach back signs and symptoms related to disease process for when to call 911?  Yes   Is the patient/caregiver able to teach back the hierarchy of who to call/visit for symptoms/problems? PCP, Specialist, Home health nurse, Urgent Care, ED, 911  Yes   Week 3 Call Completed?  Yes   Wrap up additional comments  States has started with pain in lower left abdomen radiating to left lower back-states has called Dr Diaz's office and waiting for return call. States has had pain for past 4 days. Denies any fever. States BS running \"good\". Instructed to go to " ER if pain worsens or fever, and has not heard from Dr Diaz-voiced understanding.          Tamara De Leon RN

## 2020-07-07 ENCOUNTER — DOCUMENTATION (OUTPATIENT)
Dept: UROLOGY | Facility: CLINIC | Age: 65
End: 2020-07-07

## 2020-07-07 ENCOUNTER — TELEPHONE (OUTPATIENT)
Dept: UROLOGY | Facility: CLINIC | Age: 65
End: 2020-07-07

## 2020-07-07 NOTE — TELEPHONE ENCOUNTER
Pt called stating he had lower left stomach pain. I asked him if he had any burning or hurting with urination and he verbalized no. I asked him if he had a temperature, nausea or vomiting. He verbalized no. I also asked him if he had noticed any blood in his urine and he said no. He said its in his lower left stomach and sometimes radiates to his left side. He said they did imaging and said it wasn't a stone nor an infection. I asked him if he had seen his PCP. He said his  didn't want him going to his PCP that we could keep trying him. I verbalized to him, that if it wasn't something related to urology, then he would have to be seen by his PCP for further evaluation and depending on what was discovered, be referred elsewhere. Pt verbalized understanding and said he agreed with that. He said he would call his  and let them know. I told him if there was anything else we could help him with urologically, to please let us know.

## 2020-07-13 ENCOUNTER — READMISSION MANAGEMENT (OUTPATIENT)
Dept: CALL CENTER | Facility: HOSPITAL | Age: 65
End: 2020-07-13

## 2020-07-13 NOTE — OUTREACH NOTE
Medical Week 4 Survey      Responses   [facility] patient discharged from?  [place]   COVID-19 Test Status  Not tested   Does the patient have one of the following disease processes/diagnoses(primary or secondary)?  Other   Week 4 attempt successful?  No          Sandra Oconnor RN
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
